# Patient Record
Sex: FEMALE | Race: WHITE | Employment: STUDENT | URBAN - METROPOLITAN AREA
[De-identification: names, ages, dates, MRNs, and addresses within clinical notes are randomized per-mention and may not be internally consistent; named-entity substitution may affect disease eponyms.]

---
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
## 2021-12-08 ENCOUNTER — OFFICE VISIT (OUTPATIENT)
Dept: FAMILY MEDICINE CLINIC | Facility: CLINIC | Age: 21
End: 2021-12-08
Payer: COMMERCIAL

## 2021-12-08 VITALS
OXYGEN SATURATION: 100 % | RESPIRATION RATE: 20 BRPM | TEMPERATURE: 98 F | WEIGHT: 131.19 LBS | SYSTOLIC BLOOD PRESSURE: 103 MMHG | BODY MASS INDEX: 21.6 KG/M2 | HEIGHT: 65.5 IN | DIASTOLIC BLOOD PRESSURE: 60 MMHG | HEART RATE: 62 BPM

## 2021-12-08 DIAGNOSIS — Z11.52 ENCOUNTER FOR SCREENING FOR COVID-19: ICD-10-CM

## 2021-12-08 DIAGNOSIS — J06.9 VIRAL URI WITH COUGH: Primary | ICD-10-CM

## 2021-12-08 PROCEDURE — 3078F DIAST BP <80 MM HG: CPT | Performed by: NURSE PRACTITIONER

## 2021-12-08 PROCEDURE — 3074F SYST BP LT 130 MM HG: CPT | Performed by: NURSE PRACTITIONER

## 2021-12-08 PROCEDURE — 99202 OFFICE O/P NEW SF 15 MIN: CPT | Performed by: NURSE PRACTITIONER

## 2021-12-08 PROCEDURE — 3008F BODY MASS INDEX DOCD: CPT | Performed by: NURSE PRACTITIONER

## 2021-12-08 RX ORDER — BENZONATATE 200 MG/1
200 CAPSULE ORAL 3 TIMES DAILY PRN
Qty: 30 CAPSULE | Refills: 0 | Status: SHIPPED | OUTPATIENT
Start: 2021-12-08 | End: 2021-12-18

## 2021-12-08 NOTE — PATIENT INSTRUCTIONS
Benzonatate perles every eight hours with large glass of water for cough as needed. Salt water gargles (1 tsp. Salt in 6 oz lukewarm water), use several times daily to help decrease swelling and pain in throat if needed.   Saline nasal spray to nostrils if

## 2021-12-08 NOTE — PROGRESS NOTES
HPI:   Yuliet Herrera is a 24year old female who presents with ill symptoms for about 1-2  weeks. Patient reports began as upper respiratory congestion and has progressed into chest tightness/cough.  Concerned since past history of pneumonia and bronchitis midline. NECK: supple,no adenopathy  LUNGS: clear to auscultation all lobes with no coughing appreciated until after deep inspiration/expiration  CARDIO: RRR without murmur      ASSESSMENT AND PLAN:    PLAN:Inge was seen today for nasal congestion. if needed. No work or school until fever free for 24 hours or until 10 days have passed and symptoms are improved if covid is positive. Follow up with primary care in the next week if symptoms not complete resolved or sooner if worsening symptoms.  Seek i

## 2024-05-09 ENCOUNTER — OFFICE VISIT (OUTPATIENT)
Dept: FAMILY MEDICINE CLINIC | Facility: CLINIC | Age: 24
End: 2024-05-09
Payer: COMMERCIAL

## 2024-05-09 ENCOUNTER — HOSPITAL ENCOUNTER (INPATIENT)
Facility: HOSPITAL | Age: 24
LOS: 2 days | Discharge: HOME | DRG: 392 | End: 2024-05-14
Attending: EMERGENCY MEDICINE | Admitting: HOSPITALIST

## 2024-05-09 ENCOUNTER — APPOINTMENT (OUTPATIENT)
Dept: CT IMAGING | Facility: HOSPITAL | Age: 24
DRG: 392 | End: 2024-05-09
Attending: HOSPITALIST

## 2024-05-09 VITALS
WEIGHT: 129 LBS | HEART RATE: 52 BPM | OXYGEN SATURATION: 97 % | TEMPERATURE: 97 F | DIASTOLIC BLOOD PRESSURE: 68 MMHG | RESPIRATION RATE: 16 BRPM | HEIGHT: 65.5 IN | SYSTOLIC BLOOD PRESSURE: 102 MMHG | BODY MASS INDEX: 21.23 KG/M2

## 2024-05-09 DIAGNOSIS — R11.15 CYCLICAL VOMITING: Primary | ICD-10-CM

## 2024-05-09 DIAGNOSIS — N17.9 AKI (ACUTE KIDNEY INJURY) (HCC): ICD-10-CM

## 2024-05-09 DIAGNOSIS — R11.2 NAUSEA AND VOMITING, UNSPECIFIED VOMITING TYPE: Primary | ICD-10-CM

## 2024-05-09 LAB
ALBUMIN SERPL-MCNC: 4 G/DL (ref 3.4–5)
ALBUMIN/GLOB SERPL: 1.1 {RATIO} (ref 1–2)
ALP LIVER SERPL-CCNC: 62 U/L
ALT SERPL-CCNC: 16 U/L
ANION GAP SERPL CALC-SCNC: 5 MMOL/L (ref 0–18)
AST SERPL-CCNC: 16 U/L (ref 15–37)
B-HCG UR QL: NEGATIVE
BASOPHILS # BLD AUTO: 0.02 X10(3) UL (ref 0–0.2)
BASOPHILS NFR BLD AUTO: 0.2 %
BILIRUB SERPL-MCNC: 0.7 MG/DL (ref 0.1–2)
BILIRUB UR QL STRIP.AUTO: NEGATIVE
BUN BLD-MCNC: 32 MG/DL (ref 9–23)
CALCIUM BLD-MCNC: 9.3 MG/DL (ref 8.5–10.1)
CHLORIDE SERPL-SCNC: 106 MMOL/L (ref 98–112)
CO2 SERPL-SCNC: 28 MMOL/L (ref 21–32)
COLOR UR AUTO: YELLOW
CREAT BLD-MCNC: 2.27 MG/DL
EGFRCR SERPLBLD CKD-EPI 2021: 30 ML/MIN/1.73M2 (ref 60–?)
EOSINOPHIL # BLD AUTO: 0 X10(3) UL (ref 0–0.7)
EOSINOPHIL NFR BLD AUTO: 0 %
ERYTHROCYTE [DISTWIDTH] IN BLOOD BY AUTOMATED COUNT: 11.8 %
GLOBULIN PLAS-MCNC: 3.6 G/DL (ref 2.8–4.4)
GLUCOSE BLD-MCNC: 114 MG/DL (ref 70–99)
GLUCOSE UR STRIP.AUTO-MCNC: NEGATIVE MG/DL
HCT VFR BLD AUTO: 39.7 %
HGB BLD-MCNC: 13.8 G/DL
IMM GRANULOCYTES # BLD AUTO: 0.05 X10(3) UL (ref 0–1)
IMM GRANULOCYTES NFR BLD: 0.4 %
KETONES UR STRIP.AUTO-MCNC: NEGATIVE MG/DL
LEUKOCYTE ESTERASE UR QL STRIP.AUTO: NEGATIVE
LIPASE SERPL-CCNC: 22 U/L (ref 13–75)
LYMPHOCYTES # BLD AUTO: 1.48 X10(3) UL (ref 1–4)
LYMPHOCYTES NFR BLD AUTO: 12.4 %
MCH RBC QN AUTO: 31.9 PG (ref 26–34)
MCHC RBC AUTO-ENTMCNC: 34.8 G/DL (ref 31–37)
MCV RBC AUTO: 91.9 FL
MONOCYTES # BLD AUTO: 0.81 X10(3) UL (ref 0.1–1)
MONOCYTES NFR BLD AUTO: 6.8 %
NEUTROPHILS # BLD AUTO: 9.62 X10 (3) UL (ref 1.5–7.7)
NEUTROPHILS # BLD AUTO: 9.62 X10(3) UL (ref 1.5–7.7)
NEUTROPHILS NFR BLD AUTO: 80.2 %
NITRITE UR QL STRIP.AUTO: NEGATIVE
OSMOLALITY SERPL CALC.SUM OF ELEC: 296 MOSM/KG (ref 275–295)
PH UR STRIP.AUTO: 6 [PH] (ref 5–8)
PLATELET # BLD AUTO: 266 10(3)UL (ref 150–450)
POTASSIUM SERPL-SCNC: 3.4 MMOL/L (ref 3.5–5.1)
PROT SERPL-MCNC: 7.6 G/DL (ref 6.4–8.2)
PROT UR STRIP.AUTO-MCNC: >=300 MG/DL
RBC # BLD AUTO: 4.32 X10(6)UL
SODIUM SERPL-SCNC: 139 MMOL/L (ref 136–145)
SP GR UR STRIP.AUTO: 1.02 (ref 1–1.03)
UROBILINOGEN UR STRIP.AUTO-MCNC: 0.2 MG/DL
WBC # BLD AUTO: 12 X10(3) UL (ref 4–11)

## 2024-05-09 PROCEDURE — 74176 CT ABD & PELVIS W/O CONTRAST: CPT | Performed by: HOSPITALIST

## 2024-05-09 PROCEDURE — 3008F BODY MASS INDEX DOCD: CPT | Performed by: NURSE PRACTITIONER

## 2024-05-09 PROCEDURE — 3074F SYST BP LT 130 MM HG: CPT | Performed by: NURSE PRACTITIONER

## 2024-05-09 PROCEDURE — 3078F DIAST BP <80 MM HG: CPT | Performed by: NURSE PRACTITIONER

## 2024-05-09 PROCEDURE — 99215 OFFICE O/P EST HI 40 MIN: CPT | Performed by: NURSE PRACTITIONER

## 2024-05-09 PROCEDURE — 99223 1ST HOSP IP/OBS HIGH 75: CPT | Performed by: HOSPITALIST

## 2024-05-09 RX ORDER — HYOSCYAMINE SULFATE 0.125 MG
0.12 TABLET,DISINTEGRATING ORAL EVERY 4 HOURS PRN
Status: DISCONTINUED | OUTPATIENT
Start: 2024-05-09 | End: 2024-05-14

## 2024-05-09 RX ORDER — ACETAMINOPHEN 500 MG
1000 TABLET ORAL EVERY 4 HOURS PRN
Status: DISCONTINUED | OUTPATIENT
Start: 2024-05-09 | End: 2024-05-14

## 2024-05-09 RX ORDER — ONDANSETRON 2 MG/ML
4 INJECTION INTRAMUSCULAR; INTRAVENOUS EVERY 6 HOURS PRN
Status: DISCONTINUED | OUTPATIENT
Start: 2024-05-09 | End: 2024-05-14

## 2024-05-09 RX ORDER — KETOROLAC TROMETHAMINE 15 MG/ML
15 INJECTION, SOLUTION INTRAMUSCULAR; INTRAVENOUS ONCE
Status: COMPLETED | OUTPATIENT
Start: 2024-05-09 | End: 2024-05-09

## 2024-05-09 RX ORDER — BISACODYL 10 MG
10 SUPPOSITORY, RECTAL RECTAL
Status: DISCONTINUED | OUTPATIENT
Start: 2024-05-09 | End: 2024-05-14

## 2024-05-09 RX ORDER — POLYETHYLENE GLYCOL 3350 17 G/17G
17 POWDER, FOR SOLUTION ORAL DAILY PRN
Status: DISCONTINUED | OUTPATIENT
Start: 2024-05-09 | End: 2024-05-14

## 2024-05-09 RX ORDER — MELATONIN
3 NIGHTLY PRN
Status: DISCONTINUED | OUTPATIENT
Start: 2024-05-09 | End: 2024-05-14

## 2024-05-09 RX ORDER — ENEMA 19; 7 G/133ML; G/133ML
1 ENEMA RECTAL ONCE AS NEEDED
Status: DISCONTINUED | OUTPATIENT
Start: 2024-05-09 | End: 2024-05-11

## 2024-05-09 RX ORDER — PROCHLORPERAZINE EDISYLATE 5 MG/ML
5 INJECTION INTRAMUSCULAR; INTRAVENOUS EVERY 8 HOURS PRN
Status: DISCONTINUED | OUTPATIENT
Start: 2024-05-09 | End: 2024-05-14

## 2024-05-09 RX ORDER — ONDANSETRON 4 MG/1
4 TABLET, ORALLY DISINTEGRATING ORAL EVERY 8 HOURS PRN
COMMUNITY

## 2024-05-09 RX ORDER — ONDANSETRON 2 MG/ML
4 INJECTION INTRAMUSCULAR; INTRAVENOUS ONCE
Status: COMPLETED | OUTPATIENT
Start: 2024-05-09 | End: 2024-05-09

## 2024-05-09 RX ORDER — MAGNESIUM HYDROXIDE/ALUMINUM HYDROXICE/SIMETHICONE 120; 1200; 1200 MG/30ML; MG/30ML; MG/30ML
30 SUSPENSION ORAL 4 TIMES DAILY PRN
Status: DISCONTINUED | OUTPATIENT
Start: 2024-05-09 | End: 2024-05-14

## 2024-05-09 RX ORDER — SODIUM CHLORIDE, SODIUM LACTATE, POTASSIUM CHLORIDE, CALCIUM CHLORIDE 600; 310; 30; 20 MG/100ML; MG/100ML; MG/100ML; MG/100ML
INJECTION, SOLUTION INTRAVENOUS CONTINUOUS
Status: DISCONTINUED | OUTPATIENT
Start: 2024-05-09 | End: 2024-05-12

## 2024-05-09 RX ORDER — SENNOSIDES 8.6 MG
17.2 TABLET ORAL NIGHTLY PRN
Status: DISCONTINUED | OUTPATIENT
Start: 2024-05-09 | End: 2024-05-14

## 2024-05-09 RX ORDER — TRAMADOL HYDROCHLORIDE 50 MG/1
50 TABLET ORAL EVERY 6 HOURS PRN
Status: DISCONTINUED | OUTPATIENT
Start: 2024-05-09 | End: 2024-05-14

## 2024-05-09 RX ORDER — PANTOPRAZOLE SODIUM 40 MG/1
40 TABLET, DELAYED RELEASE ORAL
Status: DISCONTINUED | OUTPATIENT
Start: 2024-05-10 | End: 2024-05-11

## 2024-05-09 RX ORDER — ECHINACEA PURPUREA EXTRACT 125 MG
1 TABLET ORAL
Status: DISCONTINUED | OUTPATIENT
Start: 2024-05-09 | End: 2024-05-14

## 2024-05-09 NOTE — ED QUICK NOTES
Orders for admission, patient is aware of plan and ready to go upstairs. Any questions, please call ED RN kenzie at extension [phone].     Patient Covid vaccination status: Unvaccinated     COVID Test Ordered in ED: None    COVID Suspicion at Admission: N/A    Running Infusions:      Mental Status/LOC at time of transport: A&Ox3    Other pertinent information:   CIWA score: N/A   NIH score:  N/A

## 2024-05-09 NOTE — ED QUICK NOTES
Per MD pt family can drive her to [facility] in [place] when room is read.    Dutasteride Pregnancy And Lactation Text: This medication is absolutely contraindicated in women, especially during pregnancy and breast feeding. Feminization of male fetuses is possible if taking while pregnant.

## 2024-05-09 NOTE — PROGRESS NOTES
Pt presented with vomiting, abdominal pain X 2 days.  Was seen in ED and given ODT Zofran, but reports she is unable to keep it down.    /68   Pulse 52   Temp 97.1 °F (36.2 °C) (Temporal)   Resp 16   Ht 5' 5.5\" (1.664 m)   Wt 129 lb (58.5 kg)   LMP 05/02/2024 (Approximate)   SpO2 97%   BMI 21.14 kg/m²     Accompanied by: self  After triage, a higher level of care was recommended to pt.  Discussed limitations of WIC and need for further evaluation due to needing further eval and treatment.  Referred to: PED  Patient verbalized understanding of rationale for further evaluation and was stable upon discharge.

## 2024-05-09 NOTE — ED PROVIDER NOTES
Patient Seen in: [facility] In [place]      History     Chief Complaint   Patient presents with    Nausea/Vomiting/Diarrhea     Stated Complaint: From [facility] - N/V x 2 days    Subjective:   HPI    23-year-old female comes to the hospital complaint of having difficulty with 2 days of nausea vomiting with some epigastric discomfort.  She states that she was in the emergency room on Sunday where she was having painful..  She is to take Benadryl pills to help control her pain with her periods but stopped after cyst was found on her right breast closer to a year ago.  She is not having any significant lower pain in the.  Pain has dissipated.  She does admit to using marijuana but that she typically does not have nausea and vomiting with it.  She denies any diarrhea.  No fevers or chills.  No dysuria.  She says she is vomiting about 5 times a day and having trouble holding anything down.  She is denying other complaints this time.    Objective:   History reviewed. No pertinent past medical history.           History reviewed. No pertinent surgical history.             Social History     Socioeconomic History    Marital status: Single   Tobacco Use    Smoking status: Never    Smokeless tobacco: Never   Vaping Use    Vaping status: Never Used   Substance and Sexual Activity    Alcohol use: Not Currently    Drug use: Yes     Frequency: 3.0 times per week     Types: Cannabis              Review of Systems    Positive for stated complaint: From [place] - N/V x 2 days  Other systems are as noted in HPI.  Constitutional and vital signs reviewed.      All other systems reviewed and negative except as noted above.    Physical Exam     ED Triage Vitals [05/09/24 1702]   /73   Pulse (!) 47   Resp 16   Temp 98.1 °F (36.7 °C)   Temp src Oral   SpO2 100 %   O2 Device        Current Vitals:   Vital Signs  BP: 126/73  Pulse: (!) 47  Resp: 16  Temp: 98.1 °F (36.7 °C)  Temp src: Oral    Oxygen Therapy  SpO2: 100  %            Physical Exam    HEENT; NCAT, EOMI, throat clear, neck supple, no LAD, no JVD  Heart S1S2 RRR  lungs: CTAB  abd: Soft  epigastric region is tender, ND,  NABS without rebound or guarding  Ext no C/C/E    ED Course     Labs Reviewed   COMP METABOLIC PANEL (14) - Abnormal; Notable for the following components:       Result Value    Glucose 114 (*)     Potassium 3.4 (*)     BUN 32 (*)     Creatinine 2.27 (*)     Calculated Osmolality 296 (*)     eGFR-Cr 30 (*)     All other components within normal limits   URINALYSIS, ROUTINE - Abnormal; Notable for the following components:    Clarity Urine Hazy (*)     Blood Urine Moderate (*)     Protein Urine >=300 (*)     All other components within normal limits   UA MICROSCOPIC ONLY, URINE - Abnormal; Notable for the following components:    Bacteria Urine 1+ (*)     Squamous Epi. Cells Moderate (*)     All other components within normal limits   CBC W/ DIFFERENTIAL - Abnormal; Notable for the following components:    WBC 12.0 (*)     Neutrophil Absolute Prelim 9.62 (*)     Neutrophil Absolute 9.62 (*)     All other components within normal limits   LIPASE - Normal   POCT PREGNANCY URINE - Normal   CBC WITH DIFFERENTIAL WITH PLATELET    Narrative:     The following orders were created for panel order CBC With Differential With Platelet.  Procedure                               Abnormality         Status                     ---------                               -----------         ------                     CBC W/ DIFFERENTIAL[[id number]]          Abnormal            Final result                 Please view results for these tests on the individual orders.          ED Course as of 05/09/24 1838  ------------------------------------------------------------  Time: 05/09 1837  Comment: While here the patient's electrolytes had a BUN/creatinine of 32 and 2.27 respectively.  Her pregnancy test was negative.  Her urine seem contaminated awake and was 12,000.  She was  given a combination of Toradol, Zofran, Protonix as well as IV fluid.  With this patient's acute kidney injury I feel the patient will need to be admitted for further hydration at this time.  The hospitalist been notified.         Medications   ketorolac (Toradol) 15 MG/ML injection 15 mg (15 mg Intravenous Given 5/9/24 1733)   ondansetron (Zofran) 4 MG/2ML injection 4 mg (4 mg Intravenous Given 5/9/24 1733)   sodium chloride 0.9 % IV bolus 1,000 mL (1,000 mL Intravenous New Bag 5/9/24 1733)   pantoprazole (Protonix) 40 mg in sodium chloride 0.9% PF 10 mL IV push (40 mg Intravenous Given 5/9/24 1733)              MDM      Differential diagnosis included gastroenteritis, cyclic vomiting, urine tract infection, dehydration in this case acute kidney injury but not limited such.  At this time in light of the patient's acute kidney injury and symptoms, I feel the patient needs to be admitted for further hydration at this time.  The hospital notified.  This note was prepared using   [employer] voice recognition dictation software.  As a result errors may occur.  When identified to these areas have been corrected.  While every attempt is made to correct errors during dictation discrepancies may still exist.  Please contact if there are any errors.            Admission disposition: 5/9/2024  6:38 PM                                        Medical Decision Making      Disposition and Plan     Clinical Impression:  1. Cyclical vomiting    2. JAYY (acute kidney injury) ([facility])         Disposition:  Admit  5/9/2024  6:38 pm    Follow-up:  No follow-up provider specified.        Medications Prescribed:  Current Discharge Medication List                            Hospital Problems       Present on Admission  Date Reviewed: 5/9/2024            ICD-10-CM Noted POA    * (Principal) Cyclical vomiting R11.15 5/9/2024 Unknown

## 2024-05-10 PROBLEM — R11.2 INTRACTABLE NAUSEA AND VOMITING: Status: ACTIVE | Noted: 2024-05-10

## 2024-05-10 PROBLEM — N83.201 CYST OF RIGHT OVARY: Status: ACTIVE | Noted: 2024-05-10

## 2024-05-10 LAB
ANION GAP SERPL CALC-SCNC: 7 MMOL/L (ref 0–18)
BASOPHILS # BLD AUTO: 0.02 X10(3) UL (ref 0–0.2)
BASOPHILS NFR BLD AUTO: 0.2 %
BUN BLD-MCNC: 33 MG/DL (ref 9–23)
CALCIUM BLD-MCNC: 8.8 MG/DL (ref 8.5–10.1)
CHLORIDE SERPL-SCNC: 114 MMOL/L (ref 98–112)
CO2 SERPL-SCNC: 22 MMOL/L (ref 21–32)
CREAT BLD-MCNC: 2.3 MG/DL
EGFRCR SERPLBLD CKD-EPI 2021: 30 ML/MIN/1.73M2 (ref 60–?)
EOSINOPHIL # BLD AUTO: 0.01 X10(3) UL (ref 0–0.7)
EOSINOPHIL NFR BLD AUTO: 0.1 %
ERYTHROCYTE [DISTWIDTH] IN BLOOD BY AUTOMATED COUNT: 11.7 %
GLUCOSE BLD-MCNC: 88 MG/DL (ref 70–99)
HCT VFR BLD AUTO: 33.4 %
HGB BLD-MCNC: 11.6 G/DL
IMM GRANULOCYTES # BLD AUTO: 0.05 X10(3) UL (ref 0–1)
IMM GRANULOCYTES NFR BLD: 0.5 %
LYMPHOCYTES # BLD AUTO: 2.16 X10(3) UL (ref 1–4)
LYMPHOCYTES NFR BLD AUTO: 21.2 %
MAGNESIUM SERPL-MCNC: 2.6 MG/DL (ref 1.6–2.6)
MCH RBC QN AUTO: 31.8 PG (ref 26–34)
MCHC RBC AUTO-ENTMCNC: 34.7 G/DL (ref 31–37)
MCV RBC AUTO: 91.5 FL
MONOCYTES # BLD AUTO: 0.96 X10(3) UL (ref 0.1–1)
MONOCYTES NFR BLD AUTO: 9.4 %
NEUTROPHILS # BLD AUTO: 7 X10 (3) UL (ref 1.5–7.7)
NEUTROPHILS # BLD AUTO: 7 X10(3) UL (ref 1.5–7.7)
NEUTROPHILS NFR BLD AUTO: 68.6 %
OSMOLALITY SERPL CALC.SUM OF ELEC: 303 MOSM/KG (ref 275–295)
PLATELET # BLD AUTO: 205 10(3)UL (ref 150–450)
POTASSIUM SERPL-SCNC: 3.8 MMOL/L (ref 3.5–5.1)
POTASSIUM SERPL-SCNC: 3.8 MMOL/L (ref 3.5–5.1)
RBC # BLD AUTO: 3.65 X10(6)UL
SODIUM SERPL-SCNC: 143 MMOL/L (ref 136–145)
WBC # BLD AUTO: 10.2 X10(3) UL (ref 4–11)

## 2024-05-10 PROCEDURE — 99232 SBSQ HOSP IP/OBS MODERATE 35: CPT | Performed by: STUDENT IN AN ORGANIZED HEALTH CARE EDUCATION/TRAINING PROGRAM

## 2024-05-10 NOTE — PLAN OF CARE
Assumed care at 0730. No acute distress noted.   Pt A&Ox4.   Room air.  No tele. Full liquid, advance as tolerated. No c/o pain, or n/v after eating.   Continent, BRP.   Ambulatory, SBA.   Meds per MAR. LR infusing at 125mL/hr per order.   Mom at bedside. Updated on POC. Probable discharge tomorrow pending morning labs.   Safety precautions in place.    Problem: PAIN - ADULT  Goal: Verbalizes/displays adequate comfort level or patient's stated pain goal  Description: INTERVENTIONS:  - Encourage pt to monitor pain and request assistance  - Assess pain using appropriate pain scale  - Administer analgesics based on type and severity of pain and evaluate response  - Implement non-pharmacological measures as appropriate and evaluate response  - Consider cultural and social influences on pain and pain management  - Manage/alleviate anxiety  - Utilize distraction and/or relaxation techniques  - Monitor for opioid side effects  - Notify MD/LIP if interventions unsuccessful or patient reports new pain  - Anticipate increased pain with activity and pre-medicate as appropriate  Outcome: Progressing     Problem: Patient/Family Goals  Goal: Patient/Family Long Term Goal  Description: Patient's Long Term Goal: Discharge back home w/o epigastric pain    Interventions:  - IVF, antiemetics, advancing diet as tolerated  - See additional Care Plan goals for specific interventions  Outcome: Progressing  Goal: Patient/Family Short Term Goal  Description: Patient's Short Term Goal: Tolerate diet     Interventions:   - Increase slowly, IVF  - See additional Care Plan goals for specific interventions  Outcome: Progressing

## 2024-05-10 NOTE — PROGRESS NOTES
[facility]   part of [facility]     Hospitalist Progress Note     Inge Barkley Patient Status:  Observation    2000 MRN [id number]   Location [facility] 3NE-A Attending Lucho, Otilio Chen, *   Hosp Day # 0 PCP Silva Ba MD     Chief Complaint: Abdominal pain    Subjective:      - Nausea/vomiting improving, able to tolerate FLD without issues, denies abd pain   - Last BM this AM, prior to that hadn't had a BM in 5 days   - Does endorse significant cannabis use, daily smoker for 4-5 years, never had issues with persistent n/v previously    - Denies associated f/c, cp, sob    Objective:    Review of Systems:   A comprehensive review of systems was completed; pertinent positive and negatives stated in subjective.    Vital signs:  Temp:  [97.1 °F (36.2 °C)-98.7 °F (37.1 °C)] 98.7 °F (37.1 °C)  Pulse:  [45-55] 55  Resp:  [16-20] 18  BP: (102-126)/(60-77) 105/70  SpO2:  [97 %-100 %] 98 %    Physical Exam:    General: No acute distress  Respiratory: no wheezes, no rhonchi  Cardiovascular: S1, S2, regular rate and rhythm  Abdomen: Soft, Non-tender, non-distended, positive bowel sounds  Neuro: No new focal deficits.   Extremities: no edema    Diagnostic Data:    Labs:  Recent Labs   Lab 24  1736   WBC 12.0*   HGB 13.8   MCV 91.9   .0       Recent Labs   Lab 24  1736   *   BUN 32*   CREATSERUM 2.27*   CA 9.3   ALB 4.0      K 3.4*      CO2 28.0   ALKPHO 62   AST 16   ALT 16   BILT 0.7   TP 7.6       Estimated Creatinine Clearance: 34.7 mL/min (A) (based on SCr of 2.27 mg/dL (H)).    No results for input(s): \"TROP\", \"TROPHS\", \"CK\" in the last 168 hours.    No results for input(s): \"PTP\", \"INR\" in the last 168 hours.               Microbiology    No results found for this visit on 05/09/24.      Imaging: Reviewed in Epic.    Medications:    pantoprazole  40 mg Intravenous QAM AC    Or    pantoprazole  40 mg Oral QAM AC       Assessment & Plan:      #  Intractable nausea/vomiting, resolving   # RLQ abdominal pain, resolved   - CT A/P with 1.8cm functional cyst in right ovary, with non-specific free fluid in pelvis   - Possibly 2/2 ruptured ovarian cyst vs. Viral gastroenteritis vs. Cannabis hyperemesis   - IVF, CLD ADAT, Antiemetics PRN   - PPI, maalox      # Functional ovarian cyst   - Gyne f/u as outpt     #JAYY   - Likely pre renal   - IVF, trend chem. CT A/P without hydro or obstruction; does have minimal perinephric stranding   - UA without infection     Otilio Yepez MD    Supplementary Documentation:     Quality:  DVT Mechanical Prophylaxis:     Early ambuation  DVT Pharmacologic Prophylaxis   Medication   None                Code Status: Full Code  Burnett: No urinary catheter in place  Burnett Duration (in days):   Central line:    SHAKEEL:     The 21st Century Cures Act makes medical notes like these available to patients in the interest of transparency. Please be advised this is a medical document. Medical documents are intended to carry relevant information, facts as evident, and the clinical opinion of the practitioner. The medical note is intended as peer to peer communication and may appear blunt or direct. It is written in medical language and may contain abbreviations or verbiage that are unfamiliar.

## 2024-05-10 NOTE — PLAN OF CARE
NURSING ADMISSION NOTE      Patient admitted via Ambulatory  Oriented to room.  Safety precautions initiated.  Pt reports nausea and given zofran.   LR started at 125.  K+ replaced per protocol.  Bed in low position.  Family & friends are bedside  Call light in reach.

## 2024-05-10 NOTE — H&P
[facility]     Inge Barkley Patient Status:  Observation    2000 MRN [id number]   Location [facility] 3NE-A Attending Mega Ken MD   Hosp Day # 0 PCP Silva Ba MD     Chief Complaint:   Chief Complaint   Patient presents with    Nausea/Vomiting/Diarrhea       Subjective:    History of Present Illness:     Inge Barkley is a 23 year old female with no significant past medical history.  She has had 2 days of nausea and vomiting with lower abdominal and epigastric discomfort.  She was the emergency room on  at an outside hospital where she had a pelvic ultrasound that showed mild to moderate pelvic fluid and a large right ovarian cyst.  She was discharged home but continued to have symptoms which she came to the emergency room here.  The patient does admit to using marijuana though has never had nausea vomiting within the past.  She does endorse having some intermittent chills but denies diarrhea.  She has had persistent nausea and vomiting.      History/Other:    Past Medical History:  History reviewed. No pertinent past medical history.  Past Surgical History:   History reviewed. No pertinent surgical history.   Family History:   No family history on file.  Social History:    reports that she has never smoked. She has never used smokeless tobacco. She reports that she does not currently use alcohol. She reports current drug use. Frequency: 3.00 times per week. Drug: Cannabis.     Allergies: No Known Allergies    Medications:    No current facility-administered medications on file prior to encounter.     Current Outpatient Medications on File Prior to Encounter   Medication Sig Dispense Refill    ondansetron 4 MG Oral Tablet Dispersible Take 1 tablet (4 mg total) by mouth every 8 (eight) hours as needed for Nausea.         Review of Systems:   A comprehensive review of systems was completed.    Pertinent positives and negatives noted in the  HPI.    Objective:   Physical Exam:    /60   Pulse (!) 49   Temp 98.4 °F (36.9 °C) (Oral)   Resp 20   Ht 5' 5\" (1.651 m)   Wt 130 lb (59 kg)   LMP 05/02/2024 (Approximate)   SpO2 99%   BMI 21.63 kg/m²   General: No acute distress, Alert  Respiratory: No rhonchi, no wheezes  Cardiovascular: S1, S2.   Abdomen: Soft, +BS, TTP RLQ and mild epigastric tenderness  Neuro: No new focal deficits  Extremities: No edema    Results:    Labs:      Labs Last 24 Hours:  Recent Labs   Lab 05/09/24  1736   WBC 12.0*   HGB 13.8   MCV 91.9   .0       Recent Labs   Lab 05/09/24  1736   *   BUN 32*   CREATSERUM 2.27*   CA 9.3   ALB 4.0      K 3.4*      CO2 28.0   ALKPHO 62   AST 16   ALT 16   BILT 0.7   TP 7.6       Estimated Creatinine Clearance: 34.7 mL/min (A) (based on SCr of 2.27 mg/dL (H)).    No results for input(s): \"TROP\", \"TROPHS\", \"CK\" in the last 168 hours.    No results for input(s): \"PTP\", \"INR\" in the last 168 hours.    No results for input(s): \"TROP\", \"CK\" in the last 168 hours.      Imaging: Imaging data reviewed in Epic.    Assessment & Plan:      #RLQ and epigastric Abd pain w/ N/V  Given chills and leukocytosis will check CT  U/A neg  Recent abd US with right ovarian cyst and mild to moderate pelvic fluid, likely contributing to symptoms  PPI and PRN maalox  Trial of relaxants  Discussed association of cannabinoids with cyclic vomiting though not clear that is contributing given timeline, has not used cannaboid in several days; pt to try hot shower to see if it helps    #JAYY  Likely pre renal  IVF  Further w/u if not improving with fluids    #Hypokalemia  Replace and monitor    #Leukocytosis  Await CT though no s/s of bacterial infection  Monitor off ABX        Plan of care discussed with ED physician    Mega Ken MD    Supplementary Documentation:     The 21st Century Cures Act makes medical notes like these available to patients in the interest of transparency. Please be  advised this is a medical document. Medical documents are intended to carry relevant information, facts as evident, and the clinical opinion of the practitioner. The medical note is intended as peer to peer communication and may appear blunt or direct. It is written in medical language and may contain abbreviations or verbiage that are unfamiliar.

## 2024-05-11 PROBLEM — R10.9 ABDOMINAL PAIN: Status: ACTIVE | Noted: 2024-05-11

## 2024-05-11 LAB
ADENOVIRUS F 40/41 PCR: NEGATIVE
ANION GAP SERPL CALC-SCNC: 8 MMOL/L (ref 0–18)
ASTROVIRUS PCR: NEGATIVE
BASOPHILS # BLD AUTO: 0.01 X10(3) UL (ref 0–0.2)
BASOPHILS NFR BLD AUTO: 0.1 %
BUN BLD-MCNC: 26 MG/DL (ref 9–23)
C CAYETANENSIS DNA SPEC QL NAA+PROBE: NEGATIVE
CALCIUM BLD-MCNC: 8.7 MG/DL (ref 8.5–10.1)
CAMPY SP DNA.DIARRHEA STL QL NAA+PROBE: NEGATIVE
CHLORIDE SERPL-SCNC: 111 MMOL/L (ref 98–112)
CO2 SERPL-SCNC: 22 MMOL/L (ref 21–32)
CREAT BLD-MCNC: 2.33 MG/DL
CRYPTOSP DNA SPEC QL NAA+PROBE: NEGATIVE
EAEC PAA PLAS AGGR+AATA ST NAA+NON-PRB: NEGATIVE
EC STX1+STX2 + H7 FLIC SPEC NAA+PROBE: NEGATIVE
EGFRCR SERPLBLD CKD-EPI 2021: 29 ML/MIN/1.73M2 (ref 60–?)
ENTAMOEBA HISTOLYTICA PCR: NEGATIVE
EOSINOPHIL # BLD AUTO: 0.02 X10(3) UL (ref 0–0.7)
EOSINOPHIL NFR BLD AUTO: 0.2 %
EPEC EAE GENE STL QL NAA+NON-PROBE: NEGATIVE
ERYTHROCYTE [DISTWIDTH] IN BLOOD BY AUTOMATED COUNT: 11.4 %
ETEC LTA+ST1A+ST1B TOX ST NAA+NON-PROBE: NEGATIVE
GIARDIA LAMBLIA PCR: NEGATIVE
GLUCOSE BLD-MCNC: 103 MG/DL (ref 70–99)
HCT VFR BLD AUTO: 34.7 %
HGB BLD-MCNC: 11.9 G/DL
IMM GRANULOCYTES # BLD AUTO: 0.03 X10(3) UL (ref 0–1)
IMM GRANULOCYTES NFR BLD: 0.3 %
LYMPHOCYTES # BLD AUTO: 1.54 X10(3) UL (ref 1–4)
LYMPHOCYTES NFR BLD AUTO: 17.4 %
MAGNESIUM SERPL-MCNC: 2.4 MG/DL (ref 1.6–2.6)
MCH RBC QN AUTO: 31.2 PG (ref 26–34)
MCHC RBC AUTO-ENTMCNC: 34.3 G/DL (ref 31–37)
MCV RBC AUTO: 91.1 FL
MONOCYTES # BLD AUTO: 0.86 X10(3) UL (ref 0.1–1)
MONOCYTES NFR BLD AUTO: 9.7 %
NEUTROPHILS # BLD AUTO: 6.39 X10 (3) UL (ref 1.5–7.7)
NEUTROPHILS # BLD AUTO: 6.39 X10(3) UL (ref 1.5–7.7)
NEUTROPHILS NFR BLD AUTO: 72.3 %
NOROVIRUS GI/GII PCR: NEGATIVE
OSMOLALITY SERPL CALC.SUM OF ELEC: 297 MOSM/KG (ref 275–295)
P SHIGELLOIDES DNA STL QL NAA+PROBE: NEGATIVE
PHOSPHATE SERPL-MCNC: 3.4 MG/DL (ref 2.5–4.9)
PLATELET # BLD AUTO: 197 10(3)UL (ref 150–450)
POTASSIUM SERPL-SCNC: 3.8 MMOL/L (ref 3.5–5.1)
RBC # BLD AUTO: 3.81 X10(6)UL
ROTAVIRUS A PCR: NEGATIVE
SALMONELLA DNA SPEC QL NAA+PROBE: NEGATIVE
SAPOVIRUS PCR: NEGATIVE
SHIGELLA SP+EIEC IPAH ST NAA+NON-PROBE: NEGATIVE
SODIUM SERPL-SCNC: 141 MMOL/L (ref 136–145)
V CHOLERAE DNA SPEC QL NAA+PROBE: NEGATIVE
VIBRIO DNA SPEC NAA+PROBE: NEGATIVE
WBC # BLD AUTO: 8.9 X10(3) UL (ref 4–11)
YERSINIA DNA SPEC NAA+PROBE: NEGATIVE

## 2024-05-11 PROCEDURE — 99232 SBSQ HOSP IP/OBS MODERATE 35: CPT | Performed by: STUDENT IN AN ORGANIZED HEALTH CARE EDUCATION/TRAINING PROGRAM

## 2024-05-11 RX ORDER — SENNOSIDES 8.6 MG
17.2 TABLET ORAL 2 TIMES DAILY
Status: DISCONTINUED | OUTPATIENT
Start: 2024-05-11 | End: 2024-05-12

## 2024-05-11 RX ORDER — POLYETHYLENE GLYCOL 3350 17 G/17G
17 POWDER, FOR SOLUTION ORAL DAILY
Status: DISCONTINUED | OUTPATIENT
Start: 2024-05-11 | End: 2024-05-12

## 2024-05-11 NOTE — CONSULTS
[facility]                       Gastroenterology Consultation-[facility] Gastroenterology    Inge Barkley Patient Status:  Observation    2000 MRN [id number]   Location [facility] 3NE-A Attending Otilio Yepez, *   Hosp Day # 0 PCP Silva Ba MD     Reason for consultation: Cyclical vomiting  HPI: Ms. Barkley is a 22 y/o with no significant PMHx who presents to the ED with reports of recurrent nausea and vomiting and epigastric pain that started on Thursday. She reports inability to tolerate liquids or foods, 1-2 episodes of non-bloody diarrhea (now resolved), and chills with no fevers. She reports feeling unwell last week when she started to have worsening menstrual cramping; evaluated in the ED on  where pelvic US noted moderate pelvic fluids and large right ovarian cyst; she was discharged home. She denies episodes of dysphagia, odynophagia, hematemesis, hematochezia, or melena stools or unintentional weight loss, poor appetite, or reflux symptoms. She takes NSAIDs occasionally, admits to daily marijuana use but reports none since last week and socially consumes alcohol. Initial labs note elevated BUN/Cr levels, no transaminase elevation or leukocytosis. CT a/p note small amount of nonspecific free fluid in the pelvis, probable 1.8 cm right ovarian cyst, nonspecific minimal bilateral perinephric stranding concerning for cystitis. She reports no prior hospitalizations, endoscopic evaluations gastrointestinal conditions or familial history of gastrointestinal cancers.   PMHx:   Past Medical History:    Intractable nausea and vomiting                PSHx: History reviewed. No pertinent surgical history.  Medications:    magnesium hydroxide (Milk of Magnesia) 400 MG/5ML oral suspension 30 mL  30 mL Oral Daily PRN    sennosides (Senokot) tab 17.2 mg  17.2 mg Oral BID    polyethylene glycol (PEG 3350) (Miralax) 17 g oral packet 17 g  17 g Oral Daily    pantoprazole (Protonix) 40 mg in  sodium chloride 0.9% PF 10 mL IV push  40 mg Intravenous Q12H    [COMPLETED] ketorolac (Toradol) 15 MG/ML injection 15 mg  15 mg Intravenous Once    [COMPLETED] ondansetron (Zofran) 4 MG/2ML injection 4 mg  4 mg Intravenous Once    [COMPLETED] sodium chloride 0.9 % IV bolus 1,000 mL  1,000 mL Intravenous Once    [COMPLETED] pantoprazole (Protonix) 40 mg in sodium chloride 0.9% PF 10 mL IV push  40 mg Intravenous Once    acetaminophen (Tylenol Extra Strength) tab 1,000 mg  1,000 mg Oral Q4H PRN    melatonin tab 3 mg  3 mg Oral Nightly PRN    glycerin-hypromellose- (Artificial Tears) 0.2-0.2-1 % ophthalmic solution 1 drop  1 drop Both Eyes QID PRN    sodium chloride (Saline Mist) 0.65 % nasal solution 1 spray  1 spray Each Nare Q3H PRN    lactated ringers infusion   Intravenous Continuous    ondansetron (Zofran) 4 MG/2ML injection 4 mg  4 mg Intravenous Q6H PRN    prochlorperazine (Compazine) 10 MG/2ML injection 5 mg  5 mg Intravenous Q8H PRN    polyethylene glycol (PEG 3350) (Miralax) 17 g oral packet 17 g  17 g Oral Daily PRN    sennosides (Senokot) tab 17.2 mg  17.2 mg Oral Nightly PRN    bisacodyl (Dulcolax) 10 MG rectal suppository 10 mg  10 mg Rectal Daily PRN    methocarbamol (Robaxin) partial tab 250 mg  250 mg Oral TID PRN    hyoscyamine sulfate (LEVSIN) disintegrating tab 0.125 mg  0.125 mg Sublingual Q4H PRN    traMADol (Ultram) tab 50 mg  50 mg Oral Q6H PRN    alum-mag hydroxide-simethicone (Maalox) 200-200-20 MG/5ML oral suspension 30 mL  30 mL Oral QID PRN    [COMPLETED] potassium chloride 40 mEq in 250mL sodium chloride 0.9% IVPB premix  40 mEq Intravenous Once     Allergies: No Known Allergies  Social HX:   Social History     Socioeconomic History    Marital status: Single   Tobacco Use    Smoking status: Never    Smokeless tobacco: Never   Vaping Use    Vaping status: Never Used   Substance and Sexual Activity    Alcohol use: Not Currently    Drug use: Yes     Frequency: 3.0 times per week      Types: Cannabis     Social Determinants of Health     Food Insecurity: No Food Insecurity (5/9/2024)    Food Insecurity     Food Insecurity: Never true   Transportation Needs: No Transportation Needs (5/9/2024)    Transportation Needs     Lack of Transportation: No   Housing Stability: Low Risk  (5/9/2024)    Housing Stability     Housing Instability: No      FamHx: The patient has no family history of colon cancer or other gastrointestinal malignancies;  No family history of ulcer disease, or inflammatory bowel disease  ROS:  In addition to the pertinent positives described above:            Infectious Disease:  No chronic infections or recent fevers prior to the acute illness            Cardiovascular: No history of CAD, prior MI, chest pain, or palpitations            Respiratory: No shortness of breath, asthma, copd, recurrent pneumonia            Hematologic: The patient reports no easy bruising, frequent gum bleeding or nose bleeding;  The patient has no history of known chronic anemia            Dermatologic: The patient reports no recent rashes or chronic skin disorders            Rheumatologic: The patient reports no history of chronic arthritis, myalgias, arthralgias            Genitourinary:  The patient reports no history of recurrent urinary tract infections, hematuria, dysuria, or nephrolithiasis           Psychiatric: The patient reports no history of depression, anxiety, suicidal ideation, or homicidal ideation           Oncologic: The patient reports no history of prior solid tumor or hematologic malignancy           ENT: The patient reports no hoarseness of voice, hearing loss, sinus congestion, tinnitus           Neurologic: The patient reports no history of seizure, stroke, or frequent headaches  PE: /76 (BP Location: Right arm)   Pulse (!) 43   Temp 98.3 °F (36.8 °C) (Oral)   Resp 18   Ht 5' 5\" (1.651 m)   Wt 130 lb (59 kg)   LMP 05/02/2024 (Approximate)   SpO2 98%   BMI 21.63  kg/m²   Gen: AAO x 3, able to speak in complete sentences  HENT: EOMI, PERRL, oropharynx is clear with moist mucosal membranes  Eyes: Sclerae are anicteric  Neck:  Supple without nuchal rigidity  CV: Regular rate and rhythm, with normal S1 and S2  Resp: Clear to auscultation bilaterally without wheezes; rubs, rhonchi, or rales  Abdomen: Soft, epigastric tenderness, non-distended with the presence of bowel sounds; No hepatosplenomegaly; no rebound or guarding; No ascites is clinically apparent; no tympany to percussion  Ext: No peripheral edema or cyanosis  Skin: Warm and dry  Psychiatric: Appropriate mood and congruent affect without obvious depression or anxiety  Labs:   Lab Results   Component Value Date    WBC 8.9 05/11/2024    HGB 11.9 05/11/2024    HCT 34.7 05/11/2024    .0 05/11/2024    CREATSERUM 2.33 05/11/2024    BUN 26 05/11/2024     05/11/2024    K 3.8 05/11/2024     05/11/2024    CO2 22.0 05/11/2024     05/11/2024    CA 8.7 05/11/2024    MG 2.4 05/11/2024    PHOS 3.4 05/11/2024     Recent Labs   Lab 05/09/24  1736 05/10/24  0723 05/11/24  0850   * 88 103*   BUN 32* 33* 26*   CREATSERUM 2.27* 2.30* 2.33*   CA 9.3 8.8 8.7    143 141   K 3.4* 3.8  3.8 3.8    114* 111   CO2 28.0 22.0 22.0     Recent Labs   Lab 05/11/24  0850   RBC 3.81   HGB 11.9*   HCT 34.7*   MCV 91.1   MCH 31.2   MCHC 34.3   RDW 11.4   NEPRELIM 6.39   WBC 8.9   .0       Recent Labs   Lab 05/09/24  1736   ALT 16   AST 16                   Imaging:   CT a/p:  Impression   CONCLUSION:       1. Findings concerning for cystitis.  Clinical correlation recommended.     2. 1.8 cm probable functional cyst in the right ovary could be further assessed with dedicated pelvic ultrasound as clinically directed.     3. Small amount of nonspecific free fluid in the pelvis may be physiologic.     Impression: Ms. Barkley is a 24 y/o with no significant PMHx who presents to the ED with reports of recurrent  nausea and vomiting and epigastric pain that started on Thursday.       Abdominal pain, N/V -CT a/p note small amount of nonspecific free fluid in the pelvis, probable 1.8 cm right ovarian cyst, nonspecific minimal bilateral perinephric stranding concerning for cystitis.    Due to symptoms, EGD discussed with patient to r/o esophagitis, gastritis, PUD; other possible diagnosis gastroparesis hyperemesis cannibalism and idiopathic cyclic vomiting syndrome.     The risks, benefits, alternatives of the procedure including the risks of anesthesia, bleeding, perforation, missed lesions, need for surgery were discussed with the patient. She expressed understanding of the risks and was agreeable to proceed. Plan for EGD in am with Dr. North.     Recommendations:     Clear liquid diet; NPO after MN  PPI IV BID  CBC/CMP in am  Pain management/antiemetics per primary service  EGD w/MAC on 5/12 with Dr. North    Thank you for the consultation, we will follow the patient with you.  Attending addendum Dr. North to follow later today and provide formal, final recommendations at that time   MARY Adams  2:25 PM  5/11/2024  [facility] Gastroenterology  [phone]  Attending physician addendum:   I personally saw and examined the patient, agree with the above findings, assessment and plan. Pt was having 5-8 episodes of emesis at home. She denies melena, hematochezia, fevers. She did have chills. She denies recent unusual ingestion. She had lower abdominal cramping but now has upper abdominal pain. She denies NSAIDs, weight loss, dysphagia. She notes some loose stools yesterday but did have Senakot for constipation.   She reports recent painful periods.   She does smoke Marijuana daily but reports that she did not smoke since Sunday.   CT showed possible cystitis but she denies dysuria, hematuria.   Recommend stool PCR. If this is negative, recommend EGD  The potentially life-threatening complications of infection,  sedation, bleeding, and perforation were reviewed along with the possible need for surgical management, transfusions, or repeat endoscopy should one of these complications arise. She understands and is agreeable.     Yulisa North DO  4:57 PM  5/11/2024  [facility] Gastroenterology  [phone]        Thank you for the consultation, we will follow the patient with you.  Attending addendum Dr. North to follow later today and provide formal, final recommendations at that time   MARY Adams  2:25 PM  5/11/2024  [facility] Gastroenterology  [phone]

## 2024-05-11 NOTE — PROGRESS NOTES
[facility]   part of [facility]     Hospitalist Progress Note     Inge JOHNSON Lubnaryanne Patient Status:  Observation    2000 MRN [id number]   Location [facility] 3NE-A Attending Otilio Yepez, *   Hosp Day # 0 PCP Silva Ba MD     Chief Complaint: Abdominal pain    Subjective:      - Pt still nauseous, unable to tolerate CLD, still having abd discomfort, denies associated f/c, cp, sob    - No BM noted    Objective:    Review of Systems:   A comprehensive review of systems was completed; pertinent positive and negatives stated in subjective.    Vital signs:  Temp:  [98 °F (36.7 °C)-98.3 °F (36.8 °C)] 98 °F (36.7 °C)  Pulse:  [41-47] 41  Resp:  [18] 18  BP: (110-118)/(69-81) 112/69  SpO2:  [98 %] 98 %    Physical Exam:    General: No acute distress  Respiratory: no wheezes, no rhonchi  Cardiovascular: S1, S2, regular rate and rhythm  Abdomen: Soft, ttp worst in epigastric region, non-distended, positive bowel sounds  Neuro: No new focal deficits.   Extremities: no edema    Diagnostic Data:    Labs:  Recent Labs   Lab 24  1736 05/10/24  0723   WBC 12.0* 10.2   HGB 13.8 11.6*   MCV 91.9 91.5   .0 205.0       Recent Labs   Lab 24  1736 05/10/24  0723   * 88   BUN 32* 33*   CREATSERUM 2.27* 2.30*   CA 9.3 8.8   ALB 4.0  --     143   K 3.4* 3.8  3.8    114*   CO2 28.0 22.0   ALKPHO 62  --    AST 16  --    ALT 16  --    BILT 0.7  --    TP 7.6  --        Estimated Creatinine Clearance: 34.2 mL/min (A) (based on SCr of 2.3 mg/dL (H)).    No results for input(s): \"TROP\", \"TROPHS\", \"CK\" in the last 168 hours.    No results for input(s): \"PTP\", \"INR\" in the last 168 hours.               Microbiology    No results found for this visit on 24.      Imaging: Reviewed in Epic.    Medications:    pantoprazole  40 mg Intravenous QAM AC    Or    pantoprazole  40 mg Oral QAM AC       Assessment & Plan:      # Intractable nausea/vomiting, resolving   # RLQ abdominal  pain, resolved   - CT A/P with 1.8cm functional cyst in right ovary, with non-specific free fluid in pelvis   - Possibly 2/2 ruptured ovarian cyst vs. Viral gastroenteritis vs. Cannabis hyperemesis   - IVF, CLD continued, Antiemetics PRN   - GI consulted given persistent N/v; possibly needs EGD?   - Adding bowel reg to r/o constipation as etiology    - PPI, maalox      # Functional ovarian cyst   - Gyne f/u as outpt     #JAYY   - Likely pre renal   - IVF, trend chem. CT A/P without hydro or obstruction; does have minimal perinephric stranding   - UA without infection      Otilio Yepez MD    Supplementary Documentation:     Quality:  DVT Mechanical Prophylaxis:     Early ambuation  DVT Pharmacologic Prophylaxis   Medication   None                Code Status: Full Code  Burnett: No urinary catheter in place  Burnett Duration (in days):   Central line:    SHAKEEL:     The 21st Century Cures Act makes medical notes like these available to patients in the interest of transparency. Please be advised this is a medical document. Medical documents are intended to carry relevant information, facts as evident, and the clinical opinion of the practitioner. The medical note is intended as peer to peer communication and may appear blunt or direct. It is written in medical language and may contain abbreviations or verbiage that are unfamiliar.

## 2024-05-11 NOTE — PLAN OF CARE
Assumed care at 0730. No acute distress noted.   Pt A&Ox4.   Room air.   No tele.   Continent, BRP.   Ambulatory, independent after set up.   Clear liquid diet, NPO at MN.   Meds per MAR. No c/o pain. N/v at start of shift.   LR infusing at 125mL/hr per order.   Updated on POC. Visitor at bedside.  GI consulted.   Stool sample to be collected when able, softeners given per MAR.   Safety precautions in place.   Needs met.     Problem: PAIN - ADULT  Goal: Verbalizes/displays adequate comfort level or patient's stated pain goal  Description: INTERVENTIONS:  - Encourage pt to monitor pain and request assistance  - Assess pain using appropriate pain scale  - Administer analgesics based on type and severity of pain and evaluate response  - Implement non-pharmacological measures as appropriate and evaluate response  - Consider cultural and social influences on pain and pain management  - Manage/alleviate anxiety  - Utilize distraction and/or relaxation techniques  - Monitor for opioid side effects  - Notify MD/LIP if interventions unsuccessful or patient reports new pain  - Anticipate increased pain with activity and pre-medicate as appropriate  Outcome: Progressing      Refill Approved    Rx renewed per Medication Renewal Policy. Medication was last renewed on 2/28/20.    Lianne Perales, [facility] Triage/Med Refill 5/18/2020     Requested Prescriptions   Pending Prescriptions Disp Refills     oxybutynin (DITROPAN XL) 10 MG ER tablet [Pharmacy Med Name: OXYBUTYNIN ER 10MG TABLETS] 90 tablet 0     Sig: TAKE 1 TABLET(10 MG) BY MOUTH DAILY       Urinary Incontinence Medications Refill Protocol Passed - 5/15/2020  6:57 PM        Passed - PCP or prescribing provider visit in past 12 months       Last office visit with prescriber/PCP: 2/27/2020 Cordell Ceja DO OR same dept: 2/27/2020 Cordell Ceja DO OR same specialty: 2/27/2020 Cordell Ceja DO  Last physical: 11/22/2019 Last MTM visit: Visit date not found   Next visit within 3 mo: Visit date not found  Next physical within 3 mo: Visit date not found  Prescriber OR PCP: Cordell eCja DO  Last diagnosis associated with med order: 1. Urge incontinence of urine  - oxybutynin (DITROPAN XL) 10 MG ER tablet [Pharmacy Med Name: OXYBUTYNIN ER 10MG TABLETS]; TAKE 1 TABLET(10 MG) BY MOUTH DAILY  Dispense: 90 tablet; Refill: 0    If protocol passes may refill for 12 months if within 3 months of last provider visit (or a total of 15 months).

## 2024-05-11 NOTE — PROGRESS NOTES
Pt A&Ox4 Room Air  Family at the bedside  Meds given per Mar  Voids freely  Monitoring Labs   1x Zofran given.   PPI  IVF infusing LR@125ml/Hr.  Safety precaution maintained  Plan of care on-going

## 2024-05-12 ENCOUNTER — ANESTHESIA EVENT (OUTPATIENT)
Dept: ENDOSCOPY | Facility: HOSPITAL | Age: 24
DRG: 392 | End: 2024-05-12
Payer: COMMERCIAL

## 2024-05-12 ENCOUNTER — ANESTHESIA (OUTPATIENT)
Dept: ENDOSCOPY | Facility: HOSPITAL | Age: 24
DRG: 392 | End: 2024-05-12
Payer: COMMERCIAL

## 2024-05-12 PROBLEM — G43.909 MIGRAINE: Status: ACTIVE | Noted: 2024-05-12

## 2024-05-12 PROBLEM — R80.9 PROTEINURIA: Status: ACTIVE | Noted: 2024-05-12

## 2024-05-12 LAB
ANION GAP SERPL CALC-SCNC: 9 MMOL/L (ref 0–18)
BASOPHILS # BLD AUTO: 0.02 X10(3) UL (ref 0–0.2)
BASOPHILS NFR BLD AUTO: 0.2 %
BILIRUB UR QL STRIP.AUTO: NEGATIVE
BUN BLD-MCNC: 22 MG/DL (ref 9–23)
C3 SERPL-MCNC: 123 MG/DL (ref 90–180)
C4 SERPL-MCNC: 30.3 MG/DL (ref 10–40)
CALCIUM BLD-MCNC: 8.8 MG/DL (ref 8.5–10.1)
CHLORIDE SERPL-SCNC: 108 MMOL/L (ref 98–112)
CLARITY UR REFRACT.AUTO: CLEAR
CO2 SERPL-SCNC: 24 MMOL/L (ref 21–32)
COLOR UR AUTO: COLORLESS
CREAT BLD-MCNC: 2.44 MG/DL
CREAT UR-SCNC: 31.2 MG/DL
EGFRCR SERPLBLD CKD-EPI 2021: 28 ML/MIN/1.73M2 (ref 60–?)
EOSINOPHIL # BLD AUTO: 0.05 X10(3) UL (ref 0–0.7)
EOSINOPHIL NFR BLD AUTO: 0.6 %
ERYTHROCYTE [DISTWIDTH] IN BLOOD BY AUTOMATED COUNT: 11.2 %
GLUCOSE BLD-MCNC: 90 MG/DL (ref 70–99)
GLUCOSE UR STRIP.AUTO-MCNC: NORMAL MG/DL
HCT VFR BLD AUTO: 33.5 %
HGB BLD-MCNC: 12 G/DL
IMM GRANULOCYTES # BLD AUTO: 0.03 X10(3) UL (ref 0–1)
IMM GRANULOCYTES NFR BLD: 0.3 %
KETONES UR STRIP.AUTO-MCNC: NEGATIVE MG/DL
LEUKOCYTE ESTERASE UR QL STRIP.AUTO: NEGATIVE
LYMPHOCYTES # BLD AUTO: 1.63 X10(3) UL (ref 1–4)
LYMPHOCYTES NFR BLD AUTO: 18.8 %
MAGNESIUM SERPL-MCNC: 2.3 MG/DL (ref 1.6–2.6)
MCH RBC QN AUTO: 32 PG (ref 26–34)
MCHC RBC AUTO-ENTMCNC: 35.8 G/DL (ref 31–37)
MCV RBC AUTO: 89.3 FL
MONOCYTES # BLD AUTO: 0.85 X10(3) UL (ref 0.1–1)
MONOCYTES NFR BLD AUTO: 9.8 %
NEUTROPHILS # BLD AUTO: 6.11 X10 (3) UL (ref 1.5–7.7)
NEUTROPHILS # BLD AUTO: 6.11 X10(3) UL (ref 1.5–7.7)
NEUTROPHILS NFR BLD AUTO: 70.3 %
NITRITE UR QL STRIP.AUTO: NEGATIVE
OSMOLALITY SERPL CALC.SUM OF ELEC: 295 MOSM/KG (ref 275–295)
PH UR STRIP.AUTO: 7 [PH] (ref 5–8)
PHOSPHATE SERPL-MCNC: 4.6 MG/DL (ref 2.5–4.9)
PLATELET # BLD AUTO: 197 10(3)UL (ref 150–450)
POTASSIUM SERPL-SCNC: 3.6 MMOL/L (ref 3.5–5.1)
PROT UR STRIP.AUTO-MCNC: 20 MG/DL
PROT UR-MCNC: 32.4 MG/DL
PROT/CREAT UR-RTO: 1.04
RBC # BLD AUTO: 3.75 X10(6)UL
SODIUM SERPL-SCNC: 103 MMOL/L
SODIUM SERPL-SCNC: 141 MMOL/L (ref 136–145)
SP GR UR STRIP.AUTO: 1.01 (ref 1–1.03)
UROBILINOGEN UR STRIP.AUTO-MCNC: NORMAL MG/DL
WBC # BLD AUTO: 8.7 X10(3) UL (ref 4–11)

## 2024-05-12 PROCEDURE — 0DB98ZX EXCISION OF DUODENUM, VIA NATURAL OR ARTIFICIAL OPENING ENDOSCOPIC, DIAGNOSTIC: ICD-10-PCS | Performed by: INTERNAL MEDICINE

## 2024-05-12 PROCEDURE — 0DB68ZX EXCISION OF STOMACH, VIA NATURAL OR ARTIFICIAL OPENING ENDOSCOPIC, DIAGNOSTIC: ICD-10-PCS | Performed by: INTERNAL MEDICINE

## 2024-05-12 PROCEDURE — 0DB78ZX EXCISION OF STOMACH, PYLORUS, VIA NATURAL OR ARTIFICIAL OPENING ENDOSCOPIC, DIAGNOSTIC: ICD-10-PCS | Performed by: INTERNAL MEDICINE

## 2024-05-12 PROCEDURE — 99232 SBSQ HOSP IP/OBS MODERATE 35: CPT | Performed by: STUDENT IN AN ORGANIZED HEALTH CARE EDUCATION/TRAINING PROGRAM

## 2024-05-12 PROCEDURE — 99223 1ST HOSP IP/OBS HIGH 75: CPT | Performed by: INTERNAL MEDICINE

## 2024-05-12 RX ORDER — POLYETHYLENE GLYCOL 3350 17 G/17G
17 POWDER, FOR SOLUTION ORAL DAILY PRN
Status: DISCONTINUED | OUTPATIENT
Start: 2024-05-12 | End: 2024-05-12

## 2024-05-12 RX ORDER — ONDANSETRON 2 MG/ML
4 INJECTION INTRAMUSCULAR; INTRAVENOUS ONCE AS NEEDED
Status: ACTIVE | OUTPATIENT
Start: 2024-05-12 | End: 2024-05-12

## 2024-05-12 RX ORDER — SENNOSIDES 8.6 MG
17.2 TABLET ORAL 2 TIMES DAILY PRN
Status: DISCONTINUED | OUTPATIENT
Start: 2024-05-12 | End: 2024-05-12

## 2024-05-12 RX ORDER — NALOXONE HYDROCHLORIDE 0.4 MG/ML
0.08 INJECTION, SOLUTION INTRAMUSCULAR; INTRAVENOUS; SUBCUTANEOUS ONCE AS NEEDED
Status: ACTIVE | OUTPATIENT
Start: 2024-05-12 | End: 2024-05-12

## 2024-05-12 RX ORDER — SODIUM CHLORIDE, SODIUM LACTATE, POTASSIUM CHLORIDE, CALCIUM CHLORIDE 600; 310; 30; 20 MG/100ML; MG/100ML; MG/100ML; MG/100ML
INJECTION, SOLUTION INTRAVENOUS CONTINUOUS
Status: DISCONTINUED | OUTPATIENT
Start: 2024-05-12 | End: 2024-05-14

## 2024-05-12 RX ORDER — SUMATRIPTAN 20 MG/1
20 SPRAY NASAL EVERY 2 HOUR PRN
Status: DISCONTINUED | OUTPATIENT
Start: 2024-05-12 | End: 2024-05-14

## 2024-05-12 RX ORDER — PANTOPRAZOLE SODIUM 40 MG/1
TABLET, DELAYED RELEASE ORAL
Qty: 30 TABLET | Refills: 0 | Status: SHIPPED | OUTPATIENT
Start: 2024-05-12

## 2024-05-12 RX ADMIN — SODIUM CHLORIDE, SODIUM LACTATE, POTASSIUM CHLORIDE, CALCIUM CHLORIDE: 600; 310; 30; 20 INJECTION, SOLUTION INTRAVENOUS at 08:21:00

## 2024-05-12 NOTE — PROGRESS NOTES
[facility]   part of [facility]     Hospitalist Progress Note     Inge Barkley Patient Status:  Observation    2000 MRN [id number]   Location [facility] 3NE-A Attending Lucho, Otilio Chen, *   Hosp Day # 0 PCP Silva Ba MD     Chief Complaint: Abdominal pain    Subjective:      - s/p EGD this AM, has findings of antral erythema.    - Pt does note she has frequent migraine HA, now approx 2x weekly, occurring since she was in middle school, never been on medication for this previously    - Takes Excedrin frequently for these HA    - Nausea improved, had multiple BM yesterday    - Denies other f/c, cp, sob    Objective:    Review of Systems:   A comprehensive review of systems was completed; pertinent positive and negatives stated in subjective.    Vital signs:  Temp:  [98.3 °F (36.8 °C)-98.5 °F (36.9 °C)] 98.5 °F (36.9 °C)  Pulse:  [42-46] 42  Resp:  [18] 18  BP: (110-122)/(71-82) 122/71  SpO2:  [97 %-98 %] 97 %    Physical Exam:    General: No acute distress  Respiratory: no wheezes, no rhonchi  Cardiovascular: S1, S2, regular rate and rhythm  Abdomen: Soft, mild ttp in epigastric region, non-distended, positive bowel sounds  Neuro: No new focal deficits.   Extremities: no edema    Diagnostic Data:    Labs:  Recent Labs   Lab 24  1736 05/10/24  0723 24  0850   WBC 12.0* 10.2 8.9   HGB 13.8 11.6* 11.9*   MCV 91.9 91.5 91.1   .0 205.0 197.0       Recent Labs   Lab 24  1736 05/10/24  0723 24  0850   * 88 103*   BUN 32* 33* 26*   CREATSERUM 2.27* 2.30* 2.33*   CA 9.3 8.8 8.7   ALB 4.0  --   --     143 141   K 3.4* 3.8  3.8 3.8    114* 111   CO2 28.0 22.0 22.0   ALKPHO 62  --   --    AST 16  --   --    ALT 16  --   --    BILT 0.7  --   --    TP 7.6  --   --        Estimated Creatinine Clearance: 33.8 mL/min (A) (based on SCr of 2.33 mg/dL (H)).    No results for input(s): \"TROP\", \"TROPHS\", \"CK\" in the last 168 hours.    No results for  input(s): \"PTP\", \"INR\" in the last 168 hours.               Microbiology    No results found for this visit on 05/09/24.      Imaging: Reviewed in Epic.    Medications:    sennosides  17.2 mg Oral BID    polyethylene glycol (PEG 3350)  17 g Oral Daily    pantoprazole  40 mg Intravenous Q12H       Assessment & Plan:      # Intractable nausea/vomiting, resolving   # RLQ abdominal pain, resolved   - CT A/P with 1.8cm functional cyst in right ovary, with non-specific free fluid in pelvis   - GI consulted, s/p EGD with findings of antral erythma, continue on PPI   - US abd ordered, pending   - now nausea improved, had multiple BM, backing off bowel reg   - Advised less excedrin use as outpt    - PPI, maalox      # Migraine HA   - d/w patient and family about f/u with neurology as outpt to consider longer term treatment for debilitating migraine HA    - imitrex PRN for abortive measures     # Functional ovarian cyst   - Gyne f/u as outpt     #JAYY   - Likely pre renal vs. 2/2 significant NSAID use   - IVF, trend chem. CT A/P without hydro or obstruction; does have minimal perinephric stranding   - UA without infection     - Repeat BMP pending this AM, if still persistently elevated will get nephrology on consult    Otilio Yepez MD    Supplementary Documentation:     Quality:  DVT Mechanical Prophylaxis:     Early ambuation  DVT Pharmacologic Prophylaxis   Medication   None                Code Status: Full Code  Burnett: No urinary catheter in place  Burnett Duration (in days):   Central line:    SHAKEEL:     The 21st Century Cures Act makes medical notes like these available to patients in the interest of transparency. Please be advised this is a medical document. Medical documents are intended to carry relevant information, facts as evident, and the clinical opinion of the practitioner. The medical note is intended as peer to peer communication and may appear blunt or direct. It is written in medical language and may  contain abbreviations or verbiage that are unfamiliar.

## 2024-05-12 NOTE — CONSULTS
[facility]  Report of Consultation    Inge Barkley Patient Status:  Observation    2000 MRN [id number]   Location [facility] 3NE-A Attending Otilio Yepez, *   Hosp Day # 0 PCP Silva Ba MD     Reason for Consultation:  JAYY    History of Present Illness:  Inge Barkley is a a(n) 23 year old female with no sig medical problems presented to the hosptial w/ complain of intractable n/v/d/abd pain for past 1 week.  Patient denies sig nsaid use- take excedrin prn- states she may have taken a total of 4-5 doses in past month  Denies dysuria  No hematuria  No body aches  No joint swelling  No rash  No edema    Father w/ some chronic joint swelling ; mother's sister w/ hx of Raynaud's    Cr on admit 2.27; has stayed about the same over past 4 days  UA w/ qualitative proteinuria      History:  Past Medical History:    Intractable nausea and vomiting     History reviewed. No pertinent surgical history.  No family history on file.   reports that she has never smoked. She has never used smokeless tobacco. She reports that she does not currently use alcohol. She reports current drug use. Frequency: 3.00 times per week. Drug: Cannabis.    Allergies:  No Known Allergies    Current Medications:    Current Facility-Administered Medications:     lactated ringers infusion, , Intravenous, Continuous    naloxone (Narcan) 0.4 MG/ML injection 0.08 mg, 0.08 mg, Intravenous, Once PRN    ondansetron (Zofran) 4 MG/2ML injection 4 mg, 4 mg, Intravenous, Once PRN    SUMAtriptan (Imitrex) 20 MG/ACT nasal solution 1 spray, 20 mg, Nasal, Q2H PRN    magnesium hydroxide (Milk of Magnesia) 400 MG/5ML oral suspension 30 mL, 30 mL, Oral, Daily PRN    pantoprazole (Protonix) 40 mg in sodium chloride 0.9% PF 10 mL IV push, 40 mg, Intravenous, Q12H    acetaminophen (Tylenol Extra Strength) tab 1,000 mg, 1,000 mg, Oral, Q4H PRN    melatonin tab 3 mg, 3 mg, Oral, Nightly PRN    glycerin-hypromellose- (Artificial Tears)  0.2-0.2-1 % ophthalmic solution 1 drop, 1 drop, Both Eyes, QID PRN    sodium chloride (Saline Mist) 0.65 % nasal solution 1 spray, 1 spray, Each Nare, Q3H PRN    ondansetron (Zofran) 4 MG/2ML injection 4 mg, 4 mg, Intravenous, Q6H PRN    prochlorperazine (Compazine) 10 MG/2ML injection 5 mg, 5 mg, Intravenous, Q8H PRN    polyethylene glycol (PEG 3350) (Miralax) 17 g oral packet 17 g, 17 g, Oral, Daily PRN    sennosides (Senokot) tab 17.2 mg, 17.2 mg, Oral, Nightly PRN    bisacodyl (Dulcolax) 10 MG rectal suppository 10 mg, 10 mg, Rectal, Daily PRN    methocarbamol (Robaxin) partial tab 250 mg, 250 mg, Oral, TID PRN    hyoscyamine sulfate (LEVSIN) disintegrating tab 0.125 mg, 0.125 mg, Sublingual, Q4H PRN    traMADol (Ultram) tab 50 mg, 50 mg, Oral, Q6H PRN    alum-mag hydroxide-simethicone (Maalox) 200-200-20 MG/5ML oral suspension 30 mL, 30 mL, Oral, QID PRN  Home Medications:  Prior to Admission Medications   Medication Sig    pantoprazole 40 MG Oral Tab EC Take one tablet (40 mg total) by mouth once daily, 30 minutes prior to breakfast.       Review of Systems:  See HPI; A total of 12 systems reviewed and otherwise unremarkable.    Physical Exam:  Vital signs: Blood pressure 137/87, pulse (!) 42, temperature 98.3 °F (36.8 °C), temperature source Oral, resp. rate 19, height 5' 5\" (1.651 m), weight 130 lb (59 kg), last menstrual period 05/02/2024, SpO2 98%.  General: No acute distress. Alert and oriented x 3.  HEENT: Moist mucous membranes. EOM-I. PERRL  Neck: No lymphadenopathy.  No JVD. No carotid bruits.  Respiratory: Clear to auscultation bilaterally.  No wheezes. No rhonchi.  Cardiovascular: S1, S2.  Regular rate and rhythm.  No murmurs. Equal pulses   Abdomen: Soft, nontender, nondistended.  Positive bowel sounds. No rebound tenderness   Neurologic: No focal neurological deficits.   Musculoskeletal: Full range of motion of all extremities.  No swelling noted.   Integument: No lesions. No  erythema.  Psychiatric: Appropriate mood and affect.    Laboratory:  Lab Results   Component Value Date    WBC 8.7 05/12/2024    HGB 12.0 05/12/2024    HCT 33.5 05/12/2024    .0 05/12/2024    CREATSERUM 2.44 05/12/2024    BUN 22 05/12/2024     05/12/2024    K 3.6 05/12/2024     05/12/2024    CO2 24.0 05/12/2024    GLU 90 05/12/2024    CA 8.8 05/12/2024    MG 2.3 05/12/2024    PHOS 4.6 05/12/2024          BUN (mg/dL)   Date Value   05/12/2024 22   05/11/2024 26 (H)   05/10/2024 33 (H)     Creatinine (mg/dL)   Date Value   05/12/2024 2.44 (H)   05/11/2024 2.33 (H)   05/10/2024 2.30 (H)     Preg test negative    Imaging:  CT A/P reviewed    Impression:  JAYY- Cr normal as of 2021 labs (Care Everywhere). Unclear etiology. Patient had presented w/ persisent n/v/d x 1 week.  No sig nsaid use. Initially thought and treated as a pre-renal cause, but Cr has not improved w/ hydration. Concern for intrinsic process such as ATN vs. Possible GN (especially given abnormal urine sediment)  Imaging does not show any evidence of obsructive uropathy- ? Cystitis  - check urine cultures; repeat UA; check urine chem  - continue fluids  - quantify protein; check C3/C4,RHETT- given patient's age/demographics there is concern for possible autoimmune cause  - further eval pending review if above  N/V/D- improving; symptomatic tx- GI following; s/p EGD; PPI  Hx of migraines      Thank you for allowing me to participate in this patient's care.  Please feel free to call me with any questions or concerns.    Saad Horton MD  5/12/2024

## 2024-05-12 NOTE — ANESTHESIA POSTPROCEDURE EVALUATION
[facility]    Inge Barkley Patient Status:  Observation   Age/Gender 23 year old female MRN [id number]   Location [facility] Attending Otilio Yepez, *   Hosp Day # 0 PCP Silva Ba MD       Anesthesia Post-op Note    ESOPHAGOGASTRODUODENOSCOPY (EGD) WITH BIOPSIES    Procedure Summary       Date: 05/12/24 Room / Location:  ENDOSCOPY 03 /  ENDOSCOPY    Anesthesia Start: 0803 Anesthesia Stop:     Procedure: ESOPHAGOGASTRODUODENOSCOPY (EGD) WITH BIOPSIES Diagnosis:       Cyclical vomiting      (GASTRITIS)    Surgeons: Yulisa North DO Anesthesiologist: Richa Taylor DO    Anesthesia Type: MAC ASA Status: 1            Anesthesia Type: MAC    Vitals Value Taken Time   /58 05/12/24 0824   Temp 98 °F (36.7 °C) 05/12/24 0824   Pulse 44 05/12/24 0824   Resp 16 05/12/24 0824   SpO2 98 % 05/12/24 0824       Patient Location: PACU    Anesthesia Type: MAC    Airway Patency: patent    Postop Pain Control: adequate    Mental Status: mildly sedated but able to meaningfully participate in the post-anesthesia evaluation    Nausea/Vomiting: none    Cardiopulmonary/Hydration status: stable euvolemic    Complications: no apparent anesthesia related complications    Postop vital signs: stable    Dental Exam: Unchanged from Preop    Patient to be discharged from PACU when criteria met.

## 2024-05-12 NOTE — PLAN OF CARE
Assumed care at 0730. No acute distress noted.   Pt A&Ox4.  Room air.   No tele.   Continent, BRP.   Ambulatory, independent after set up.   Regular diet, tolerating well so far.   NPO at MN for US abd tomorrow. Pt aware.   Meds per MAR. No c/o pain. No n/v/d.  Patient and family updated on POC.   Safety precautions in place.   Needs me at this time.     1830: Pt endorsed R flank pain. Hospitalist and Nephro notified. US Kidney ordered.     Problem: PAIN - ADULT  Goal: Verbalizes/displays adequate comfort level or patient's stated pain goal  Description: INTERVENTIONS:  - Encourage pt to monitor pain and request assistance  - Assess pain using appropriate pain scale  - Administer analgesics based on type and severity of pain and evaluate response  - Implement non-pharmacological measures as appropriate and evaluate response  - Consider cultural and social influences on pain and pain management  - Manage/alleviate anxiety  - Utilize distraction and/or relaxation techniques  - Monitor for opioid side effects  - Notify MD/LIP if interventions unsuccessful or patient reports new pain  - Anticipate increased pain with activity and pre-medicate as appropriate  Outcome: Progressing

## 2024-05-12 NOTE — PLAN OF CARE
Pt A&Ox4 Room Air  Sitting upright in Bed  Family at the bedside  No Tele Order in place.   Meds Given per Mar  Voids Freely  Monitoring Labs  NPO at Midnight EGD  Stool PCR pending.   Safety precaution Maintained  Plan of Care on-going       Problem: PAIN - ADULT  Goal: Verbalizes/displays adequate comfort level or patient's stated pain goal  Description: INTERVENTIONS:  - Encourage pt to monitor pain and request assistance  - Assess pain using appropriate pain scale  - Administer analgesics based on type and severity of pain and evaluate response  - Implement non-pharmacological measures as appropriate and evaluate response  - Consider cultural and social influences on pain and pain management  - Manage/alleviate anxiety  - Utilize distraction and/or relaxation techniques  - Monitor for opioid side effects  - Notify MD/LIP if interventions unsuccessful or patient reports new pain  - Anticipate increased pain with activity and pre-medicate as appropriate  Outcome: Progressing

## 2024-05-12 NOTE — OPERATIVE REPORT
Operative Report-Esophagogastroduodenoscopy with cold biopsies  Inge Barkley 6/1/2000   [facility] [id number] MRN [id number]   Admission Date 5/9/2024 Operation Date 5/12/2024   Attending Physician Otilio Yepez, * Operating Physician Yulisa North DO     PREOPERATIVE DIAGNOSIS/INDICATION: Vomiting, epigastric pain  POSTOPERTATIVE DIAGNOSIS: Antral erythema  PROCEDURE PERFORMED: EGD  INFORMED CONSENT: Once a brief history and physical examination was performed, the risks, benefits and alternatives to the procedure were discussed with the patient and/or family and informed consent was obtained.  The risks of sedation, perforation, missed lesions and need for surgery were all discussed.  Patient expressed understanding of the risks and agreed to proceed.    PROCEDURE DESCRIPTION:  The patient was then brought to the endoscopy suite where the patient's pulse, pulse oximetry and blood pressure were monitored. The pateint was placed in the left lateral decubitus position and deep sedation was administered. Once adequate sedation was achieved, a bite block was placed and a lubricated tip of an Olympus video upper endoscope was inserted through the oropharynx and gently manipulated through the esophagus into the stomach and the distal duodenum. Upon withdrawal of the endoscope, careful visualization of the mucosa was performed.   FINDINGS:  ESOPHAGUS: The Esophagus was normal.   EGJ: The SCJ was located at the GEJ, at 40 cm from the incisors, and was regular.   STOMACH: There was mild erythema in the antrum. There was some mild oozing with biopsies which stopped spontaneously.    DUODENUM: Normal.   THERAPEUTICS: Biopsies were performed from the antrum and body with a cold biopsy forceps to rule out H Pylori and from the duodenum to rule out celiac disease.   RECOMMENDATIONS:   Post EGD precautions, watch for bleeding, infection, perforation, adverse drug reactions   Follow biopsies.  PPI daily x 4 weeks   Avoid  NSAIDs   Recommend abdominal ultrasound  CC Report:     Yulisa North DO  5/12/2024  8:20 AM

## 2024-05-12 NOTE — ANESTHESIA PREPROCEDURE EVALUATION
PRE-OP EVALUATION    Patient Name: Inge Barkley    Admit Diagnosis: Cyclical vomiting [R11.15]  JAYY (acute kidney injury) (HCC) [N17.9]    Pre-op Diagnosis: Cyclical vomiting [R11.15]    ESOPHAGOGASTRODUODENOSCOPY (EGD)    Anesthesia Procedure: ESOPHAGOGASTRODUODENOSCOPY (EGD)    Surgeons and Role:     * Yulisa North, DO - Primary    Pre-op vitals reviewed.  Temp: 98.5 °F (36.9 °C)  Pulse: 42  Resp: 18  BP: 122/71  SpO2: 97 %  Body mass index is 21.63 kg/m².    Current medications reviewed.  Hospital Medications:   magnesium hydroxide (Milk of Magnesia) 400 MG/5ML oral suspension 30 mL  30 mL Oral Daily PRN    sennosides (Senokot) tab 17.2 mg  17.2 mg Oral BID    polyethylene glycol (PEG 3350) (Miralax) 17 g oral packet 17 g  17 g Oral Daily    pantoprazole (Protonix) 40 mg in sodium chloride 0.9% PF 10 mL IV push  40 mg Intravenous Q12H    [COMPLETED] ketorolac (Toradol) 15 MG/ML injection 15 mg  15 mg Intravenous Once    [COMPLETED] ondansetron (Zofran) 4 MG/2ML injection 4 mg  4 mg Intravenous Once    [COMPLETED] sodium chloride 0.9 % IV bolus 1,000 mL  1,000 mL Intravenous Once    [COMPLETED] pantoprazole (Protonix) 40 mg in sodium chloride 0.9% PF 10 mL IV push  40 mg Intravenous Once    acetaminophen (Tylenol Extra Strength) tab 1,000 mg  1,000 mg Oral Q4H PRN    melatonin tab 3 mg  3 mg Oral Nightly PRN    glycerin-hypromellose- (Artificial Tears) 0.2-0.2-1 % ophthalmic solution 1 drop  1 drop Both Eyes QID PRN    sodium chloride (Saline Mist) 0.65 % nasal solution 1 spray  1 spray Each Nare Q3H PRN    lactated ringers infusion   Intravenous Continuous    ondansetron (Zofran) 4 MG/2ML injection 4 mg  4 mg Intravenous Q6H PRN    prochlorperazine (Compazine) 10 MG/2ML injection 5 mg  5 mg Intravenous Q8H PRN    polyethylene glycol (PEG 3350) (Miralax) 17 g oral packet 17 g  17 g Oral Daily PRN    sennosides (Senokot) tab 17.2 mg  17.2 mg Oral Nightly PRN    bisacodyl (Dulcolax) 10 MG rectal  suppository 10 mg  10 mg Rectal Daily PRN    methocarbamol (Robaxin) partial tab 250 mg  250 mg Oral TID PRN    hyoscyamine sulfate (LEVSIN) disintegrating tab 0.125 mg  0.125 mg Sublingual Q4H PRN    traMADol (Ultram) tab 50 mg  50 mg Oral Q6H PRN    alum-mag hydroxide-simethicone (Maalox) 200-200-20 MG/5ML oral suspension 30 mL  30 mL Oral QID PRN    [COMPLETED] potassium chloride 40 mEq in 250mL sodium chloride 0.9% IVPB premix  40 mEq Intravenous Once       Outpatient Medications:     Medications Prior to Admission   Medication Sig Dispense Refill Last Dose    ondansetron 4 MG Oral Tablet Dispersible Take 1 tablet (4 mg total) by mouth every 8 (eight) hours as needed for Nausea.          Allergies: Patient has no known allergies.      Anesthesia Evaluation    Patient summary reviewed.    Anesthetic Complications  (-) history of anesthetic complications         GI/Hepatic/Renal    Negative GI/hepatic/renal ROS.                             Cardiovascular    Negative cardiovascular ROS.  ECG reviewed.  Exercise tolerance: good     MET: >4                                           Endo/Other    Negative endo/other ROS.                              Pulmonary    Negative pulmonary ROS.                       Neuro/Psych    Negative neuro/psych ROS.                                  History reviewed. No pertinent surgical history.  Social History     Socioeconomic History    Marital status: Single   Tobacco Use    Smoking status: Never    Smokeless tobacco: Never   Vaping Use    Vaping status: Never Used   Substance and Sexual Activity    Alcohol use: Not Currently    Drug use: Yes     Frequency: 3.0 times per week     Types: Cannabis     History   Drug Use    Frequency: 3.0 times per week    Types: Cannabis     Available pre-op labs reviewed.  Lab Results   Component Value Date    WBC 8.9 05/11/2024    RBC 3.81 05/11/2024    HGB 11.9 (L) 05/11/2024    HCT 34.7 (L) 05/11/2024    MCV 91.1 05/11/2024    MCH 31.2 05/11/2024     MCHC 34.3 05/11/2024    RDW 11.4 05/11/2024    .0 05/11/2024     Lab Results   Component Value Date     05/11/2024    K 3.8 05/11/2024     05/11/2024    CO2 22.0 05/11/2024    BUN 26 (H) 05/11/2024    CREATSERUM 2.33 (H) 05/11/2024     (H) 05/11/2024    CA 8.7 05/11/2024            Airway      Mallampati: II  Mouth opening: >3 FB  TM distance: 4 - 6 cm  Neck ROM: full Cardiovascular    Cardiovascular exam normal.  Rhythm: regular  Rate: normal  (-) murmur   Dental    Dentition appears grossly intact         Pulmonary    Pulmonary exam normal.  Breath sounds clear to auscultation bilaterally.               Other findings              ASA: 1   Plan: MAC  NPO status verified and patient meets guidelines.  Patient has not taken beta blockers in last 24 hours.  Post-procedure pain management plan discussed with surgeon and patient.      Plan/risks discussed with: patient                Present on Admission:  **None**

## 2024-05-13 ENCOUNTER — APPOINTMENT (OUTPATIENT)
Dept: ULTRASOUND IMAGING | Facility: HOSPITAL | Age: 24
DRG: 392 | End: 2024-05-13
Attending: INTERNAL MEDICINE

## 2024-05-13 ENCOUNTER — APPOINTMENT (OUTPATIENT)
Dept: ULTRASOUND IMAGING | Facility: HOSPITAL | Age: 24
DRG: 392 | End: 2024-05-13
Attending: STUDENT IN AN ORGANIZED HEALTH CARE EDUCATION/TRAINING PROGRAM

## 2024-05-13 ENCOUNTER — APPOINTMENT (OUTPATIENT)
Facility: HOSPITAL | Age: 24
DRG: 392 | End: 2024-05-13
Attending: STUDENT IN AN ORGANIZED HEALTH CARE EDUCATION/TRAINING PROGRAM

## 2024-05-13 PROBLEM — N18.9 CHRONIC KIDNEY DISEASE: Status: ACTIVE | Noted: 2024-05-13

## 2024-05-13 LAB
ANION GAP SERPL CALC-SCNC: 9 MMOL/L (ref 0–18)
BUN BLD-MCNC: 18 MG/DL (ref 9–23)
CALCIUM BLD-MCNC: 9 MG/DL (ref 8.5–10.1)
CHLORIDE SERPL-SCNC: 105 MMOL/L (ref 98–112)
CO2 SERPL-SCNC: 26 MMOL/L (ref 21–32)
CREAT BLD-MCNC: 2.28 MG/DL
DSDNA IGG SERPL IA-ACNC: 1.3 IU/ML
EGFRCR SERPLBLD CKD-EPI 2021: 30 ML/MIN/1.73M2 (ref 60–?)
ENA AB SER QL IA: 0.1 UG/L
ENA AB SER QL IA: NEGATIVE
GLUCOSE BLD-MCNC: 98 MG/DL (ref 70–99)
INR BLD: 1.06 (ref 0.8–1.2)
OSMOLALITY SERPL CALC.SUM OF ELEC: 292 MOSM/KG (ref 275–295)
POTASSIUM SERPL-SCNC: 3.7 MMOL/L (ref 3.5–5.1)
PROTHROMBIN TIME: 13.8 SECONDS (ref 11.6–14.8)
SODIUM SERPL-SCNC: 140 MMOL/L (ref 136–145)

## 2024-05-13 PROCEDURE — 93975 VASCULAR STUDY: CPT | Performed by: STUDENT IN AN ORGANIZED HEALTH CARE EDUCATION/TRAINING PROGRAM

## 2024-05-13 PROCEDURE — 99233 SBSQ HOSP IP/OBS HIGH 50: CPT | Performed by: INTERNAL MEDICINE

## 2024-05-13 PROCEDURE — 99232 SBSQ HOSP IP/OBS MODERATE 35: CPT | Performed by: STUDENT IN AN ORGANIZED HEALTH CARE EDUCATION/TRAINING PROGRAM

## 2024-05-13 PROCEDURE — 76775 US EXAM ABDO BACK WALL LIM: CPT | Performed by: STUDENT IN AN ORGANIZED HEALTH CARE EDUCATION/TRAINING PROGRAM

## 2024-05-13 PROCEDURE — 76700 US EXAM ABDOM COMPLETE: CPT | Performed by: INTERNAL MEDICINE

## 2024-05-13 NOTE — PROGRESS NOTES
Inpatient Follow up Note    Inge Barkley Patient Status:  Inpatient    2000 MRN [id number]   Location [facility] 3NE-A Attending Otilio Yepez, *   Hosp Day # 1 PCP Silva Ba MD     Reason for Consultation   Epigastric pain     Subjective   Reports epigastric pain has resolved, denies nausea/vomiting, tolerating solid food. Going for kidney biopsy tomorrow.              Objective:     /72 (BP Location: Left arm)   Pulse 50   Temp 97.6 °F (36.4 °C) (Oral)   Resp 18   Ht 5' 5\" (1.651 m)   Wt 130 lb (59 kg)   LMP 2024 (Approximate)   SpO2 96%   BMI 21.63 kg/m²   Gen: AAOx3  CV: RRR with normal S1 / S2  Resp: CTA bilaterally  Abd: (+)BS, soft, non-tender, non-distended; no rebound or guarding  Ext: No edema or cyanosis  Skin: Warm and dry     Labs/Imaging     LABRCNTIP[PGLU:5@  Recent Labs   Lab 24  1113   INR 1.06         Recent Labs   Lab 24  1736 05/10/24  0723 24  0850 24  0924   WBC 12.0* 10.2 8.9 8.7   HGB 13.8 11.6* 11.9* 12.0   .0 205.0 197.0 197.0       Recent Labs   Lab 24  1736 05/10/24  0723 24  0850 24  0924 24  0820    143 141 141 140   K 3.4* 3.8  3.8 3.8 3.6 3.7    114* 111 108 105   CO2 28.0 22.0 22.0 24.0 26.0   BUN 32* 33* 26* 22 18   CREATSERUM 2.27* 2.30* 2.33* 2.44* 2.28*       Recent Labs   Lab 24  1736   ALT 16   AST 16     US ABDOMEN COMPLETE (CPT=76700)    Result Date: 2024  CONCLUSION:  Echogenic renal parenchyma consistent with chronic renal parenchymal disease.   LOCATION:  [place]    Dictated by (CST): Edmund Finley MD on 2024 at 11:23 AM     Finalized by (CST): Edmund Finley MD on 2024 at 11:24 AM       US KIDNEY W DOPPLER (CVD=10243/49884)    Result Date: 2024  CONCLUSION:  No sonographic evidence of significant renal artery stenosis.   LOCATION:  [place]     Dictated by (CST): Edmund Finley MD on 2024 at 11:04 AM     Finalized by (CST):  Edmund Finley MD on 5/13/2024 at 11:07 AM      AST   Date/Time Value Ref Range Status   05/09/2024 05:36 PM 16 15 - 37 U/L Final     ALT   Date/Time Value Ref Range Status   05/09/2024 05:36 PM 16 13 - 56 U/L Final     BUN   Date/Time Value Ref Range Status   05/13/2024 08:20 AM 18 9 - 23 mg/dL Final       512 EGD: FINDINGS:  ESOPHAGUS: The Esophagus was normal.   EGJ: The SCJ was located at the GEJ, at 40 cm from the incisors, and was regular.   STOMACH: There was mild erythema in the antrum. There was some mild oozing with biopsies which stopped spontaneously.    DUODENUM: Normal.   THERAPEUTICS: Biopsies were performed from the antrum and body with a cold biopsy forceps to rule out H Pylori and from the duodenum to rule out celiac disease.        Assessment   Ms. Barkley is a 22 y/o with no significant PMHx who presents to the ED with reports of recurrent nausea and vomiting and epigastric pain, with CT and EGD not revealing for a cause of her symptoms; EGD histology pending. Ddx includes celiac disease, H pylori, cannabinoid hyperemesis syndrome, CVS. Her symptoms have resolved today.       Plan   -ok for regular diet from gi perspective  -marijuana cessation  -f/u EGD path  -we will arrange OP GI f/u in 2-4 weeks             Franky Velasquez MD  1:56 PM  5/13/2024  [facility]  [phone]

## 2024-05-13 NOTE — PROGRESS NOTES
[facility]   part of [facility]     Nephrology Progress Note    Inge Barkley Patient Status:  Inpatient    2000 MRN [id number]   Location [facility] 3NE-A Attending Otilio Yepez, *   Hosp Day # 1 PCP Silva Ba MD       SUBJECTIVE:  Noted to have IV infiltrate overnight, diarrhea persists        Physical Exam:   BP (!) 130/92 (BP Location: Left arm)   Pulse (!) 48   Temp 97.5 °F (36.4 °C) (Temporal)   Resp 18   Ht 5' 5\" (1.651 m)   Wt 130 lb (59 kg)   LMP 2024 (Approximate)   SpO2 96%   BMI 21.63 kg/m²   Temp (24hrs), Av.9 °F (36.6 °C), Min:97.5 °F (36.4 °C), Max:98.3 °F (36.8 °C)     No intake or output data in the 24 hours ending 24 0849  Last 3 Weights   24 1701 130 lb (59 kg)   24 1621 129 lb (58.5 kg)   21 1442 131 lb 3.2 oz (59.5 kg)   19 1444 125 lb (56.7 kg) (48%, Z= -0.05)*   19 1417 120 lb (54.4 kg) (38%, Z= -0.32)*     * Growth percentiles are based on CDC (Girls, 2-20 Years) data.     General: Alert and oriented in no apparent distress.  HEENT: No scleral icterus, MMM  Neck: Supple, no BRANDT or thyromegaly  Cardiac: Regular rate and rhythm, S1, S2 normal, no murmur or rub  Lungs: Clear without wheezes, rales, rhonchi.    Abdomen: Soft, non-tender. + bowel sounds, no palpable organomegaly  Extremities: Without clubbing, cyanosis or edema.  Neurologic: Alert and oriented, cranial nerves grossly intact, moving all extremities  Skin: Warm and dry, no rash        Labs:     Recent Labs   Lab 24  1736 05/10/24  0723 24  0850 24  0924   WBC 12.0* 10.2 8.9 8.7   HGB 13.8 11.6* 11.9* 12.0   MCV 91.9 91.5 91.1 89.3   .0 205.0 197.0 197.0       Recent Labs   Lab 24  1736 05/10/24  0723 24  0850 24  0924 24  0820    143 141 141 140   K 3.4* 3.8  3.8 3.8 3.6 3.7    114* 111 108 105   CO2 28.0 22.0 22.0 24.0 26.0   BUN 32* 33* 26* 22 18   CREATSERUM 2.27* 2.30* 2.33*  2.44* 2.28*   CA 9.3 8.8 8.7 8.8 9.0   MG  --  2.6 2.4 2.3  --    PHOS  --   --  3.4 4.6  --    * 88 103* 90 98       Recent Labs   Lab 05/09/24  1736   ALT 16   AST 16   ALB 4.0       No results for input(s): \"PGLU\" in the last 168 hours.    Meds:   Current Facility-Administered Medications   Medication Dose Route Frequency    lactated ringers infusion   Intravenous Continuous    SUMAtriptan (Imitrex) 20 MG/ACT nasal solution 1 spray  20 mg Nasal Q2H PRN    magnesium hydroxide (Milk of Magnesia) 400 MG/5ML oral suspension 30 mL  30 mL Oral Daily PRN    pantoprazole (Protonix) 40 mg in sodium chloride 0.9% PF 10 mL IV push  40 mg Intravenous Q12H    acetaminophen (Tylenol Extra Strength) tab 1,000 mg  1,000 mg Oral Q4H PRN    melatonin tab 3 mg  3 mg Oral Nightly PRN    glycerin-hypromellose- (Artificial Tears) 0.2-0.2-1 % ophthalmic solution 1 drop  1 drop Both Eyes QID PRN    sodium chloride (Saline Mist) 0.65 % nasal solution 1 spray  1 spray Each Nare Q3H PRN    ondansetron (Zofran) 4 MG/2ML injection 4 mg  4 mg Intravenous Q6H PRN    prochlorperazine (Compazine) 10 MG/2ML injection 5 mg  5 mg Intravenous Q8H PRN    polyethylene glycol (PEG 3350) (Miralax) 17 g oral packet 17 g  17 g Oral Daily PRN    sennosides (Senokot) tab 17.2 mg  17.2 mg Oral Nightly PRN    bisacodyl (Dulcolax) 10 MG rectal suppository 10 mg  10 mg Rectal Daily PRN    methocarbamol (Robaxin) partial tab 250 mg  250 mg Oral TID PRN    hyoscyamine sulfate (LEVSIN) disintegrating tab 0.125 mg  0.125 mg Sublingual Q4H PRN    traMADol (Ultram) tab 50 mg  50 mg Oral Q6H PRN    alum-mag hydroxide-simethicone (Maalox) 200-200-20 MG/5ML oral suspension 30 mL  30 mL Oral QID PRN         Impression/Plan:        JAYY- Cr normal as of 2021 labs (Care Everywhere). Unclear etiology. Patient had presented w/ persisent n/v/d x 1 week.  No sig nsaid use. Initially thought and treated as a pre-renal cause, but Cr has not improved w/ hydration.  Concern for intrinsic process such as ATN vs. Possible GN (especially given abnormal urine sediment with microhematuria and approx one gram protein)  Imaging does not show any evidence of obsructive uropathy- ? Cystitis  - creat still essentially unchanged despite IVF  - C3/C4 normal, check other serologies.  Will plan for renal bx today  N/V/D- improving; symptomatic tx- GI following; s/p EGD; PPI  Hx of migraines    Questions/concerns were discussed with patient and/or family by bedside.          Bruce Levine MD  5/13/2024  8:49 AM

## 2024-05-13 NOTE — PROGRESS NOTES
[facility]   part of [facility]     Hospitalist Progress Note     Inge Barkley Patient Status:  Observation    2000 MRN [id number]   Location [facility] 3NE-A Attending Lucho, Otilio Chen, *   Hosp Day # 1 PCP Silva Ba MD     Chief Complaint: Abdominal pain    Subjective:      - Pt notes pain in right flank when walking yesterday, states she gets this occasionally when not eating   - Notes she is sexually active with 1 male partner for 3-4 years, no prior hx PID/STIs, no current vaginal discharge or bleeding noted, partner not symptomatic either. Pain mostly located in epigastric and flank region, pain in lower abdomen is pinpoint in suprapubic region   - Tolerated diet yesterday without n/v, denies f/c, cp, sob    Objective:    Review of Systems:   A comprehensive review of systems was completed; pertinent positive and negatives stated in subjective.    Vital signs:  Temp:  [97.5 °F (36.4 °C)-98.3 °F (36.8 °C)] 97.5 °F (36.4 °C)  Pulse:  [41-53] 48  Resp:  [16-19] 18  BP: (100-141)/(58-95) 130/92  SpO2:  [94 %-100 %] 96 %    Physical Exam:    General: No acute distress  Respiratory: no wheezes, no rhonchi  Cardiovascular: S1, S2, regular rate and rhythm  Abdomen: Soft, mild ttp in epigastric region, no pain to deep palpation in RLQ or LLQ, no significant pain over suprapubic region. non-distended, positive bowel sounds  Neuro: No new focal deficits.   Extremities: no edema    Diagnostic Data:    Labs:  Recent Labs   Lab 05/09/24  1736 05/10/24  0723 24  0850 24  0924   WBC 12.0* 10.2 8.9 8.7   HGB 13.8 11.6* 11.9* 12.0   MCV 91.9 91.5 91.1 89.3   .0 205.0 197.0 197.0       Recent Labs   Lab 24  1736 05/10/24  0723 24  0850 24  0924   * 88 103* 90   BUN 32* 33* 26* 22   CREATSERUM 2.27* 2.30* 2.33* 2.44*   CA 9.3 8.8 8.7 8.8   ALB 4.0  --   --   --     143 141 141   K 3.4* 3.8  3.8 3.8 3.6    114* 111 108   CO2 28.0 22.0 22.0  24.0   ALKPHO 62  --   --   --    AST 16  --   --   --    ALT 16  --   --   --    BILT 0.7  --   --   --    TP 7.6  --   --   --        Estimated Creatinine Clearance: 32.3 mL/min (A) (based on SCr of 2.44 mg/dL (H)).    No results for input(s): \"TROP\", \"TROPHS\", \"CK\" in the last 168 hours.    No results for input(s): \"PTP\", \"INR\" in the last 168 hours.               Microbiology    No results found for this visit on 05/09/24.      Imaging: Reviewed in Epic.    Medications:    pantoprazole  40 mg Intravenous Q12H       Assessment & Plan:      # Intractable nausea/vomiting, resolving   # RLQ abdominal pain, resolved   - CT A/P with 1.8cm functional cyst in right ovary, with non-specific free fluid in pelvis   - No clear history findings c/f PID, no pain around lower pelvic region, will add on G/C swab, but low clinical suspicion at this time.    - GI consulted, s/p EGD with findings of antral erythma, continue on PPI   - US abd and renal with chronic renal disease noted, no JACQUIE or thrombosis   - now nausea improved, had multiple BM, backing off bowel reg   - Advised less excedrin use as outpt    - PPI, maalox      # Migraine HA   - d/w patient and family about f/u with neurology as outpt to consider longer term treatment for debilitating migraine HA    - imitrex PRN for abortive measures     # Functional ovarian cyst   - Gyne f/u as outpt     #JAYY   - Initially though 2/2 prerenal etiology, though not improved with IVF, now evaluating for intrinsic etiology   - Renal biopsy today   - Autoimmune studies per nephrology on consult    - Outpt nephrology f/u     Otilio Yepez MD    Supplementary Documentation:     Quality:  DVT Mechanical Prophylaxis:     Early ambuation  DVT Pharmacologic Prophylaxis   Medication   None                Code Status: Full Code  Burnett: No urinary catheter in place  Burnett Duration (in days):   Central line:    SHAKEEL:     The 21st Century Cures Act makes medical notes like these  available to patients in the interest of transparency. Please be advised this is a medical document. Medical documents are intended to carry relevant information, facts as evident, and the clinical opinion of the practitioner. The medical note is intended as peer to peer communication and may appear blunt or direct. It is written in medical language and may contain abbreviations or verbiage that are unfamiliar.

## 2024-05-13 NOTE — PLAN OF CARE
Assumed patient care @ 07:30.  Alert and oriented x 4.  Ambulatory/independent.  Room air.  No tele.  Denies pain medication.  Regular diet.  LR infusing at 100 ml./hr.  Safety fall precautions maintained.  Needs attended, call light within her reach.  Bed in lowest position.  Problem: Patient/Family Goals  Goal: Patient/Family Long Term Goal  Description: Patient's Long Term Goal: discharge to home.    Interventions:  - Nephro follows.  -GI follows.  - See additional Care Plan goals for specific interventions  5/13/2024 1549 by Marisol Araiza, RN  Outcome: Progressing  5/13/2024 1549 by Marisol Araiza, RN  Outcome: Progressing  Goal: Patient/Family Short Term Goal  Description: Patient's Short Term Goal: stable health condition.    Interventions:   - CLAUDIA of the abdomen.  -Doppler of the kidney.  - urine for culture.  -assessed for flank pain.  -assessed mental status.  -addressed her needs.    - See additional Care Plan goals for specific interventions  5/13/2024 1549 by Marisol Araiza, RN  Outcome: Progressing  5/13/2024 1549 by Marisol Araiza, RN  Outcome: Progressing

## 2024-05-13 NOTE — IMAGING NOTE
Received order for renal biopsy. Pt in US currently for imaging. Spoke with pt bedside RN Marisol House. She will obtain vitals upon return to unit, and will address BP if elevated. Pt has been NPO since midnight and will plan to review with Dr Yepez, NAEEM. Pt on no blood thinners.   After reviewing case with Dr Yepez, will plan to proceed tomorrow with the renal biopsy. Marisol House RN notified of this so patient could eat today and will make NPO at midnight and obtain PT/INR in AM.

## 2024-05-13 NOTE — PROGRESS NOTES
Assumed care at 1930  A&ox4  RA  No tele  Up ad nikki  Continent  PIV Infusing LR 100ml/hr  NPO 0000 ultrasound abd/doppler kidney  Safety precautions in place  All needs met at this time  Continue current POC    0125 PIV infiltrated. R elbow swollen. Ice packs applied. Md notified. No new orders.   RN attempted placing IV to L arm pt refusing IV placement at this time. MD aware

## 2024-05-14 ENCOUNTER — APPOINTMENT (OUTPATIENT)
Dept: ULTRASOUND IMAGING | Facility: HOSPITAL | Age: 24
DRG: 392 | End: 2024-05-14
Attending: INTERNAL MEDICINE

## 2024-05-14 VITALS
HEART RATE: 52 BPM | SYSTOLIC BLOOD PRESSURE: 119 MMHG | OXYGEN SATURATION: 95 % | DIASTOLIC BLOOD PRESSURE: 83 MMHG | BODY MASS INDEX: 21.66 KG/M2 | HEIGHT: 65 IN | TEMPERATURE: 98 F | WEIGHT: 130 LBS | RESPIRATION RATE: 21 BRPM

## 2024-05-14 LAB
ALBUMIN SERPL-MCNC: 3.4 G/DL (ref 3.4–5)
ALBUMIN/GLOB SERPL: 1.1 {RATIO} (ref 1–2)
ALP LIVER SERPL-CCNC: 52 U/L
ALT SERPL-CCNC: 22 U/L
ANION GAP SERPL CALC-SCNC: 5 MMOL/L (ref 0–18)
ANTI-MPO ANTIBODIES: <0.2 UNITS
ANTI-PR3 ANTIBODIES: <0.2 UNITS
AST SERPL-CCNC: 13 U/L (ref 15–37)
BILIRUB SERPL-MCNC: 0.8 MG/DL (ref 0.1–2)
BUN BLD-MCNC: 17 MG/DL (ref 9–23)
C TRACH DNA SPEC QL NAA+PROBE: NEGATIVE
CALCIUM BLD-MCNC: 8.6 MG/DL (ref 8.5–10.1)
CHLORIDE SERPL-SCNC: 108 MMOL/L (ref 98–112)
CO2 SERPL-SCNC: 29 MMOL/L (ref 21–32)
CREAT BLD-MCNC: 2.01 MG/DL
EGFRCR SERPLBLD CKD-EPI 2021: 35 ML/MIN/1.73M2 (ref 60–?)
GLOBULIN PLAS-MCNC: 3 G/DL (ref 2.8–4.4)
GLUCOSE BLD-MCNC: 98 MG/DL (ref 70–99)
INR BLD: 1.14 (ref 0.8–1.2)
N GONORRHOEA DNA SPEC QL NAA+PROBE: NEGATIVE
OSMOLALITY SERPL CALC.SUM OF ELEC: 296 MOSM/KG (ref 275–295)
POTASSIUM SERPL-SCNC: 3.9 MMOL/L (ref 3.5–5.1)
PROT SERPL-MCNC: 6.4 G/DL (ref 6.4–8.2)
PROTHROMBIN TIME: 14.7 SECONDS (ref 11.6–14.8)
SODIUM SERPL-SCNC: 142 MMOL/L (ref 136–145)

## 2024-05-14 PROCEDURE — 99239 HOSP IP/OBS DSCHRG MGMT >30: CPT | Performed by: STUDENT IN AN ORGANIZED HEALTH CARE EDUCATION/TRAINING PROGRAM

## 2024-05-14 PROCEDURE — 0TB13ZX EXCISION OF LEFT KIDNEY, PERCUTANEOUS APPROACH, DIAGNOSTIC: ICD-10-PCS | Performed by: RADIOLOGY

## 2024-05-14 PROCEDURE — 76942 ECHO GUIDE FOR BIOPSY: CPT | Performed by: INTERNAL MEDICINE

## 2024-05-14 PROCEDURE — 50200 RENAL BIOPSY PERQ: CPT | Performed by: INTERNAL MEDICINE

## 2024-05-14 PROCEDURE — 99152 MOD SED SAME PHYS/QHP 5/>YRS: CPT | Performed by: INTERNAL MEDICINE

## 2024-05-14 PROCEDURE — 99232 SBSQ HOSP IP/OBS MODERATE 35: CPT | Performed by: INTERNAL MEDICINE

## 2024-05-14 RX ORDER — FLUMAZENIL 0.1 MG/ML
INJECTION INTRAVENOUS
Status: DISCONTINUED
Start: 2024-05-14 | End: 2024-05-14

## 2024-05-14 RX ORDER — ONDANSETRON 2 MG/ML
INJECTION INTRAMUSCULAR; INTRAVENOUS
Status: DISCONTINUED
Start: 2024-05-14 | End: 2024-05-14

## 2024-05-14 RX ORDER — NALOXONE HYDROCHLORIDE 0.4 MG/ML
INJECTION, SOLUTION INTRAMUSCULAR; INTRAVENOUS; SUBCUTANEOUS
Status: DISCONTINUED
Start: 2024-05-14 | End: 2024-05-14

## 2024-05-14 RX ORDER — MIDAZOLAM HYDROCHLORIDE 1 MG/ML
INJECTION INTRAMUSCULAR; INTRAVENOUS
Status: DISCONTINUED
Start: 2024-05-14 | End: 2024-05-14

## 2024-05-14 RX ORDER — MIDAZOLAM HYDROCHLORIDE 1 MG/ML
1 INJECTION INTRAMUSCULAR; INTRAVENOUS EVERY 5 MIN PRN
Status: ACTIVE | OUTPATIENT
Start: 2024-05-14 | End: 2024-05-14

## 2024-05-14 RX ORDER — SODIUM CHLORIDE 9 MG/ML
INJECTION, SOLUTION INTRAVENOUS CONTINUOUS
Status: DISCONTINUED | OUTPATIENT
Start: 2024-05-14 | End: 2024-05-14

## 2024-05-14 RX ORDER — FLUMAZENIL 0.1 MG/ML
0.2 INJECTION INTRAVENOUS AS NEEDED
Status: DISCONTINUED | OUTPATIENT
Start: 2024-05-14 | End: 2024-05-14

## 2024-05-14 RX ORDER — NALOXONE HYDROCHLORIDE 0.4 MG/ML
80 INJECTION, SOLUTION INTRAMUSCULAR; INTRAVENOUS; SUBCUTANEOUS AS NEEDED
Status: DISCONTINUED | OUTPATIENT
Start: 2024-05-14 | End: 2024-05-14

## 2024-05-14 NOTE — IMAGING NOTE
Planning for 1100 procedure in US. Tati RN at bedside made aware. Pt NPO since midnight, labs back, pt to be consented in department.

## 2024-05-14 NOTE — IMAGING NOTE
US Renal Biopsy procedure completed.   Obtained biopsy. Specimen sent to Pathology.    Presently denies pain. Tolerated procedure well.   Transported pt to Rm [id number] accompanied by Fuentes DISLA. Bedside report given to Tati DISLA.

## 2024-05-14 NOTE — H&P
[facility]   part of [facility]   History & Physical    Inge Barkley Patient Status:  Inpatient    2000 MRN [id number]   Location [facility] 3NE-A Attending Otilio Yepez, *   Hosp Day # 2 PCP Silva Ba MD     Admitting Diagnosis:   JAYY    History of Present Illness:   24 yo F w/ JAYY presents for kidney core biopsy    History   Past Medical History:  Past Medical History:    Intractable nausea and vomiting       Past Surgical History:  History reviewed. No pertinent surgical history.    Social History:  Social History     Tobacco Use    Smoking status: Never    Smokeless tobacco: Never   Substance Use Topics    Alcohol use: Not Currently        Family History:  No family history on file.    Allergies/Medications:   Allergies:  No Known Allergies    Medications:    Current Outpatient Medications:     pantoprazole 40 MG Oral Tab EC, Take one tablet (40 mg total) by mouth once daily, 30 minutes prior to breakfast., Disp: 30 tablet, Rfl: 0    pantoprazole 40 MG Oral Tab EC, Take one tablet (40 mg total) by mouth once daily, 30 minutes prior to breakfast., Disp: 90 tablet, Rfl: 0    Physical Exam & Review of Systems:   Physical Exam:    BP (!) 141/91   Pulse (!) 44   Temp 97.7 °F (36.5 °C) (Axillary)   Resp 11   Ht 65\"   Wt 130 lb   LMP 2024 (Approximate)   SpO2 100%   BMI 21.63 kg/m²     General: NAD  Neck: No JVD  Lungs: CTA bilat  Heart: RRR, S1, S2  Abdomen: Soft, NT/ND, BS+x4  Extremities: Warm, dry, no LE edema bilat  Pulses: 2+ bilat DP    Results:   Labs:  Recent Labs   Lab 05/10/24  0723 24  0850 24  0924   RBC 3.65* 3.81 3.75*   HGB 11.6* 11.9* 12.0   HCT 33.4* 34.7* 33.5*   MCV 91.5 91.1 89.3   MCH 31.8 31.2 32.0   MCHC 34.7 34.3 35.8   RDW 11.7 11.4 11.2   NEPRELIM 7.00 6.39 6.11   WBC 10.2 8.9 8.7   .0 197.0 197.0     Recent Labs   Lab 24  1113 24  0706   PTP 13.8 14.7   INR 1.06 1.14     Recent Labs   Lab 24  0924  05/13/24  0820 05/14/24  0706   GLU 90 98 98   BUN 22 18 17   CREATSERUM 2.44* 2.28* 2.01*   CA 8.8 9.0 8.6    140 142   K 3.6 3.7 3.9    105 108   CO2 24.0 26.0 29.0       Assessment/Plan:   Impression: 22 yo F JAYY for kidney core biopsy    I have discussed with the patient and/or legal representative the potential benefits, risks, and side effects of this procedure, the likelihood of the patient achieving goals; and the potential problems that might occur during recuperation.  I discussed reasonable alternatives to the procedure, including risks, benefits and side effects related to the alternatives, and risks related to not receiving this procedure.      Recommendations: US guided core biopsy of the L kidney    Randolph Mckeon MD  5/14/2024  11:41 AM

## 2024-05-14 NOTE — PROGRESS NOTES
[facility]      Nephrology Progress Note    Inge Barkley Patient Status:  Inpatient    2000 MRN [id number]   [facility] 3NE-A Attending Otilio Yepez, *   Hosp Day # 2 PCP Silva Ba MD       SUBJECTIVE:  Some R side back/flank pain  No f/c  No n/v  Good UOP      Current Facility-Administered Medications:     sodium chloride 0.9% infusion, , Intravenous, Continuous    midazolam (Versed) 2 MG/2ML injection 1 mg, 1 mg, Intravenous, Q5 Min PRN    fentaNYL (Sublimaze) 50 mcg/mL injection 50 mcg, 50 mcg, Intravenous, Q5 Min PRN    naloxone (Narcan) 0.4 MG/ML injection 80 mcg, 80 mcg, Intravenous, PRN    flumazenil (Romazicon) 0.5 mg/5mL injection 0.2 mg, 0.2 mg, Intravenous, PRN    fentaNYL (Sublimaze) 50 mcg/mL injection, , ,     midazolam (Versed) 2 MG/2ML injection, , ,     naloxone (Narcan) 0.4 MG/ML injection, , ,     flumazenil (Romazicon) 0.5 mg/5mL injection, , ,     ondansetron (Zofran) 4 MG/2ML injection, , ,     pantoprazole (Protonix) 40 mg in sodium chloride 0.9% PF 10 mL IV push, 40 mg, Intravenous, Q24H    lactated ringers infusion, , Intravenous, Continuous    SUMAtriptan (Imitrex) 20 MG/ACT nasal solution 1 spray, 20 mg, Nasal, Q2H PRN    magnesium hydroxide (Milk of Magnesia) 400 MG/5ML oral suspension 30 mL, 30 mL, Oral, Daily PRN    acetaminophen (Tylenol Extra Strength) tab 1,000 mg, 1,000 mg, Oral, Q4H PRN    melatonin tab 3 mg, 3 mg, Oral, Nightly PRN    glycerin-hypromellose- (Artificial Tears) 0.2-0.2-1 % ophthalmic solution 1 drop, 1 drop, Both Eyes, QID PRN    sodium chloride (Saline Mist) 0.65 % nasal solution 1 spray, 1 spray, Each Nare, Q3H PRN    ondansetron (Zofran) 4 MG/2ML injection 4 mg, 4 mg, Intravenous, Q6H PRN    prochlorperazine (Compazine) 10 MG/2ML injection 5 mg, 5 mg, Intravenous, Q8H PRN    polyethylene glycol (PEG 3350) (Miralax) 17 g oral packet 17 g, 17 g, Oral, Daily PRN    sennosides (Senokot) tab 17.2 mg, 17.2 mg, Oral,  Nightly PRN    bisacodyl (Dulcolax) 10 MG rectal suppository 10 mg, 10 mg, Rectal, Daily PRN    methocarbamol (Robaxin) partial tab 250 mg, 250 mg, Oral, TID PRN    hyoscyamine sulfate (LEVSIN) disintegrating tab 0.125 mg, 0.125 mg, Sublingual, Q4H PRN    traMADol (Ultram) tab 50 mg, 50 mg, Oral, Q6H PRN    alum-mag hydroxide-simethicone (Maalox) 200-200-20 MG/5ML oral suspension 30 mL, 30 mL, Oral, QID PRN          Physical Exam:   BP (!) 128/93 (BP Location: Left arm)   Pulse 65   Temp 97.7 °F (36.5 °C) (Axillary)   Resp 18   Ht 5' 5\" (1.651 m)   Wt 130 lb (59 kg)   LMP 2024 (Approximate)   SpO2 99%   BMI 21.63 kg/m²   Temp (24hrs), Av.8 °F (36.6 °C), Min:97.6 °F (36.4 °C), Max:98 °F (36.7 °C)     No intake or output data in the 24 hours ending 24 1050  Last 3 Weights   24 1701 130 lb (59 kg)   24 1621 129 lb (58.5 kg)   21 1442 131 lb 3.2 oz (59.5 kg)   19 1444 125 lb (56.7 kg) (48%, Z= -0.05)*   19 1417 120 lb (54.4 kg) (38%, Z= -0.32)*     * Growth percentiles are based on CDC (Girls, 2-20 Years) data.     General: Alert and oriented in no apparent distress.  HEENT: No scleral icterus, MMM  Neck: Supple, no BRANDT or thyromegaly  Cardiac: Regular rate and rhythm, S1, S2 normal, no murmur or rub  Lungs: Clear without wheezes, rales, rhonchi.    Abdomen: Soft, non-tender. + bowel sounds, no palpable organomegaly  Extremities: Without clubbing, cyanosis or edema.  Neurologic: Alert and oriented, cranial nerves grossly intact, moving all extremities  Skin: Warm and dry, no rash        Recent Labs     24  0924 24  1113 24  0706   WBC 8.7  --   --    HGB 12.0  --   --    MCV 89.3  --   --    .0  --   --    INR  --  1.06 1.14       Recent Labs     24  0924  0820 24  0706    140 142   K 3.6 3.7 3.9    105 108   CO2 24.0 26.0 29.0   BUN 22 18 17   CREATSERUM 2.44* 2.28* 2.01*   CA 8.8 9.0 8.6   MG 2.3  --   --     PHOS 4.6  --   --        Recent Labs     05/14/24  0706   ALT 22   AST 13*   ALB 3.4         Impression/Plan:        JAYY- Cr normal as of 2021 labs (Care Everywhere). Unclear etiology. Patient had presented w/ persisent n/v/d x 1 week.  No sig nsaid use. Initially thought and treated as a pre-renal cause, but Cr has not improved w/ hydration. Concern for intrinsic process such as ATN vs. Possible GN (especially given abnormal urine sediment with microhematuria and approx one gram protein)  Imaging does not show any evidence of obsructive uropathy- ? Cystitis  - C3/C4 normal, check other serologies.  Will plan for renal bx today  N/V/D- improving; symptomatic tx- GI following; s/p EGD; PPI  Hx of migraines  Back/flank pain- abnl imaging of R ovary- ? PID- cultures pending     Questions/concerns were discussed with patient and/or family by bedside.    Saad Horton  5/14/2024

## 2024-05-14 NOTE — PROGRESS NOTES
[facility]   part of [facility]     Hospitalist Progress Note     Inge Calvoryanne Patient Status:  Observation    2000 MRN [id number]   Location [facility] 3NE-A Attending Lucho, Otilio Chen, *   Hosp Day # 2 PCP Silva Ba MD     Chief Complaint: Abdominal pain    Subjective:      - Pt seen following renal biopsy, states that today nausea and abdominal pain now completely resolved. Tolerating soft diet without any issues   - Feels slight discomfort around right ovary site but improved as well, states she has outpt Gyne appt scheduled for next week   - Denies other f/c, cp, sob     Objective:    Review of Systems:   A comprehensive review of systems was completed; pertinent positive and negatives stated in subjective.    Vital signs:  Temp:  [97.6 °F (36.4 °C)-98 °F (36.7 °C)] 97.7 °F (36.5 °C)  Pulse:  [50-65] 65  Resp:  [18] 18  BP: (117-128)/(72-93) 128/93  SpO2:  [96 %-99 %] 99 %    Physical Exam:    General: No acute distress  Respiratory: no wheezes, no rhonchi  Cardiovascular: S1, S2, regular rate and rhythm  Abdomen: Soft, nontender, no significant pain over suprapubic region. non-distended, positive bowel sounds  Neuro: No new focal deficits.   Extremities: no edema    Diagnostic Data:    Labs:  Recent Labs   Lab 05/09/24  1736 05/10/24  0723 24  0850 24  0924 24  1113 24  0706   WBC 12.0* 10.2 8.9 8.7  --   --    HGB 13.8 11.6* 11.9* 12.0  --   --    MCV 91.9 91.5 91.1 89.3  --   --    .0 205.0 197.0 197.0  --   --    INR  --   --   --   --  1.06 1.14       Recent Labs   Lab 05/09/24  1736 05/10/24  0723 24  0850 24  0924 24  0820   *   < > 103* 90 98   BUN 32*   < > 26* 22 18   CREATSERUM 2.27*   < > 2.33* 2.44* 2.28*   CA 9.3   < > 8.7 8.8 9.0   ALB 4.0  --   --   --   --       < > 141 141 140   K 3.4*   < > 3.8 3.6 3.7      < > 111 108 105   CO2 28.0   < > 22.0 24.0 26.0   ALKPHO 62  --   --   --   --    AST  16  --   --   --   --    ALT 16  --   --   --   --    BILT 0.7  --   --   --   --    TP 7.6  --   --   --   --     < > = values in this interval not displayed.       Estimated Creatinine Clearance: 34.5 mL/min (A) (based on SCr of 2.28 mg/dL (H)).    No results for input(s): \"TROP\", \"TROPHS\", \"CK\" in the last 168 hours.    Recent Labs   Lab 05/13/24  1113 05/14/24  0706   PTP 13.8 14.7   INR 1.06 1.14                  Microbiology    No results found for this visit on 05/09/24.      Imaging: Reviewed in Epic.    Medications:    pantoprazole  40 mg Intravenous Q24H       Assessment & Plan:      # Intractable nausea/vomiting, resolved    # RLQ abdominal pain, resolved   - CT A/P with 1.8cm functional cyst in right ovary, with non-specific free fluid in pelvis   - No clear history findings c/f PID, no pain around lower pelvic region, G/C testing negative, and low clinical suspicion for PID at this time    - GI consulted, s/p EGD with findings of antral erythma, continue on PPI; plan for outpt GI f/u   - US abd and renal with chronic renal disease noted, no JACQUIE or thrombosis   - now nausea improved, had multiple BM, backing off bowel reg   - Advised less excedrin use as outpt    - PPI, maalox      # Migraine HA   - d/w patient and family about f/u with neurology as outpt to consider longer term treatment for debilitating migraine HA    - imitrex PRN for abortive measures     # Functional ovarian cyst   - Gyne f/u as outpt     #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  #JAYY   - Initially though 2/2 prerenal etiology, though not improved with IVF, now evaluating for intrinsic etiology   - Renal biopsy completed   - Autoimmune studies per nephrology on consult    - Outpt nephrology f/u     DC home later today if tolerating diet and cleared by all consultants     Otilio Yepez MD    Supplementary Documentation:     Quality:  DVT Mechanical Prophylaxis:     Early ambuation  DVT Pharmacologic Prophylaxis   Medication   None                 Code Status: Full Code  Burnett: No urinary catheter in place  Burnett Duration (in days):   Central line:    SHAKEEL:     The 21st Century Cures Act makes medical notes like these available to patients in the interest of transparency. Please be advised this is a medical document. Medical documents are intended to carry relevant information, facts as evident, and the clinical opinion of the practitioner. The medical note is intended as peer to peer communication and may appear blunt or direct. It is written in medical language and may contain abbreviations or verbiage that are unfamiliar.

## 2024-05-14 NOTE — PROCEDURES
[facility]   part of [facility]  Procedure Note    Inge Barkley Patient Status:  Inpatient    2000 MRN [id number]   Location [facility] 3NE-A Attending Otilio Yepez, *   Hosp Day # 2 PCP Silva Ba MD     Procedure: US kidney core biopsy L     Pre-Procedure Diagnosis:  JAYY    Post-Procedure Diagnosis: Same    Anesthesia:  Local and Sedation    Findings:  2 x 18g core biopsy of the L kidney    Specimens: As above    Blood Loss:  Minimal    Tourniquet Time: None  Complications:  None  Drains:  None    Secondary Diagnosis:  None    Randolph Mckeon MD  2024

## 2024-05-14 NOTE — PROGRESS NOTES
Assumed care at 1930  A&ox4  RA  No tele orders  Continent  NPO 0000  Denies pain at this time  Up ad nikki  PIV infusing LR 100ml/hr  Renal biopsy tomorrow   Safety precautions in place  All needs met at this time  Continue current POC

## 2024-05-15 LAB — ANTI-PLA2R: <1.8 RU/ML

## 2024-05-16 ENCOUNTER — MED REC SCAN ONLY (OUTPATIENT)
Dept: NEPHROLOGY | Facility: CLINIC | Age: 24
End: 2024-05-16

## 2024-05-16 ENCOUNTER — TELEPHONE (OUTPATIENT)
Dept: NEPHROLOGY | Facility: CLINIC | Age: 24
End: 2024-05-16

## 2024-05-16 NOTE — DISCHARGE SUMMARY
[place] HOSPITALIST  DISCHARGE SUMMARY     Inge Barkley Patient Status:  Inpatient    2000 MRN [id number]   Location [facility] 3NE-A Attending Leena Yepez, DO   Hosp Day # 2 PCP Silva Ba MD     Date of Admission: 2024  Date of Discharge: 2024  Discharge Disposition: Home or Self Care    Admitting Diagnosis:   Cyclical vomiting [R11.15]  JAYY (acute kidney injury) (HCC) [N17.9]    Hospital Discharge Diagnoses:   Intractable nausea and vomiting  Suspected intrinsic renal disease  CKD  Migraine headaches  Functional ovarian cyst      Lace+ Score: 19  59-90 High Risk  29-58 Medium Risk  0-28   Low Risk.    TCM Follow-Up Recommendation:  LACE < 29: Low Risk of readmission after discharge from the hospital; Still recommend for TCM follow-up.        Discharge Diagnosis:   Intractable nausea and vomiting    History of Present Illness: Inge Barkley is a 23 year old female with no significant past medical history.  She has had 2 days of nausea and vomiting with lower abdominal and epigastric discomfort.  She was the emergency room on  at an outside hospital where she had a pelvic ultrasound that showed mild to moderate pelvic fluid and a large right ovarian cyst.  She was discharged home but continued to have symptoms which she came to the emergency room here.  The patient does admit to using marijuana though has never had nausea vomiting within the past.  She does endorse having some intermittent chills but denies diarrhea.  She has had persistent nausea and vomiting.      Brief Synopsis: Patient initially presented with intractable nausea and vomiting, CT abdomen pelvis, nonspecific free fluid in pelvis, no other acute intra-abdominal abnormalities.  Initially trialed on IV fluids, antiemetics as needed.  Patient with slow improvement, GI consulted, completed EGD with findings of antral erythema, recommended continuing PPI with outpatient GI follow-up.  Ultrasound abdomen completed  without additional findings noted.  Patient with no clear history or findings concerning for PID, GC testing sent and negative, low clinical suspicion for PID at this time.  Eventually patient had gradual resolution of nausea and vomiting and able to tolerate soft diet at discharge.  Patient incidentally also noted to have elevated creatinine during admission, initially thought secondary to prerenal etiology however not improved with IV fluids and thus nephrology consulted, lab work sent for evaluation for intrinsic etiologies of renal disease, renal biopsy completed.  Patient to follow-up outpatient with nephrology for review of biopsy results and further management.    Procedures during hospitalization:   Left renal biopsy by IR    Incidental or significant findings and recommendations (brief descriptions):  New CKD, suspected intrinsic renal disease, status post renal biopsy by IR, follow-up with nephrology for further workup and management  Intractable nausea and vomiting, unspecified etiology, EGD with antral erythema, continue PPI and outpatient GI follow-up  Encouraged marijuana cessation as this could be contributing to nausea and vomiting  History of significant migraine headaches, approximately 1-2 times a week, recommended outpatient neurology follow-up for management of chronic migraines  Outpatient gynecology follow-up for functional ovarian cysts    Lab/Test results pending at Discharge:   None    Consultants:  Nephrology, gastroenterology    Discharge Medication List:     Discharge Medications        START taking these medications        Instructions Prescription details   pantoprazole 40 MG Tbec  Commonly known as: Protonix      Take one tablet (40 mg total) by mouth once daily, 30 minutes prior to breakfast.   Quantity: 30 tablet  Refills: 0            CONTINUE taking these medications        Instructions Prescription details   ondansetron 4 MG Tbdp  Commonly known as: Zofran-ODT      Take 1 tablet  (4 mg total) by mouth every 8 (eight) hours as needed for Nausea.   Refills: 0               Where to Get Your Medications        These medications were sent to [id number] - [place], [place] - [address] AT [facility] [address], [phone], [phone]  [address], [facility] [address]      Hours: 24-hours Phone: [phone]   pantoprazole 40 MG [facility]         [place]    Follow-up appointment:   Yulisa North DO  [address] [address]  [phone]    Follow up in 8 week(s)      Ritesh Cheng DO  [address] [facility]  [address]  [place] [address]  [phone]    Call in 1 week(s)  Establish care for migraine headaches    Saad Horton MD  [address] [facility] [address]  [place] [address]  [phone]    Call in 1 week(s)  Establish care    Silva Ba MD  [address] [facility] [address]  [phone]    Call in 1 week(s)  For routine follow-up after hospitilization      Vital signs:       Physical Exam:  See exam from day of discharge note  -----------------------------------------------------------------------------------------------  PATIENT DISCHARGE INSTRUCTIONS: See electronic chart    Otilio Yepez MD 5/16/2024    Time spent:  40 minutes

## (undated) DEVICE — BITEBLOCK ENDOSCP 60FR MAXI STRP

## (undated) DEVICE — 3M™ RED DOT™ MONITORING ELECTRODE WITH FOAM TAPE AND STICKY GEL 2570-3, 3/BAG, 200/CASE, 54/PLT: Brand: RED DOT™

## (undated) DEVICE — ADAPTER SPIKE DUAL DAVOL 15010

## (undated) DEVICE — 3M™ STERI-STRIP™ REINFORCED ADHESIVE SKIN CLOSURES, R1547, 1/2 IN X 4 IN (12 MM X 100 MM), 6 STRIPS/ENVELOPE: Brand: 3M™ STERI-STRIP™

## (undated) DEVICE — KENDALL SCD EXPRESS SLEEVES, KNEE LENGTH, MEDIUM: Brand: KENDALL SCD

## (undated) DEVICE — DEVICE FLUID REMOVAL-WATER BUG

## (undated) DEVICE — SUTURE NABSB OTHCRD 2 OS-6

## (undated) DEVICE — SUTURE VICRYL 2-0 FS-1

## (undated) DEVICE — ZIMMER® STERILE DISPOSABLE TOURNIQUET CUFF WITH PLC, DUAL PORT, SINGLE BLADDER, 34 IN. (86 CM)

## (undated) DEVICE — OCCLUSIVE GAUZE STRIP OVERWRAP,3% BISMUTH TRIBROMOPHENATE IN PETROLATUM BLEND: Brand: XEROFORM

## (undated) DEVICE — 10FT COMBINED O2 DELIVERY/CO2 MONITORING. FILTER WITH MICROSTREAM TYPE LUER: Brand: DUAL ADULT NASAL CANNULA

## (undated) DEVICE — STERILE POLYISOPRENE POWDER-FREE SURGICAL GLOVES: Brand: PROTEXIS

## (undated) DEVICE — GIJAW SINGLE-USE BIOPSY FORCEPS WITH NEEDLE: Brand: GIJAW

## (undated) DEVICE — KIT VLV 5 PC AIR H2O SUCT BX ENDOGATOR CONN

## (undated) DEVICE — RESTORE DRILL POINT PIN WITH EYELET .094 X 14 INCHES (2.39MM X 35.6CM): Brand: RESTORE

## (undated) DEVICE — SUTURE NABSB OTHCRD VIOL TIE

## (undated) DEVICE — SUTURE FIBERWIRE 2 AR-7202

## (undated) DEVICE — SUTURE FIBERWIRE S AR-7200

## (undated) DEVICE — Device

## (undated) DEVICE — KNEE ARTHROSCOPY CDS: Brand: MEDLINE INDUSTRIES, INC.

## (undated) DEVICE — 1200CC GUARDIAN II: Brand: GUARDIAN

## (undated) DEVICE — [RESECTOR CUTTER, ARTHROSCOPIC SHAVER BLADE,  DO NOT RESTERILIZE,  DO NOT USE IF PACKAGE IS DAMAGED,  KEEP DRY,  KEEP AWAY FROM SUNLIGHT]: Brand: FORMULA

## (undated) DEVICE — PADDING CAST COTTON  4

## (undated) DEVICE — KIT ACL TRANS AR-1898S

## (undated) DEVICE — SUPER SPONGES,MEDIUM: Brand: KERLIX

## (undated) DEVICE — POLAR CARE CUBE COOLING UNIT

## (undated) DEVICE — CAUTERY PENCIL

## (undated) DEVICE — PRECISION W/10.0MM STOP (9.2 X 0.51 X 18.4MM)

## (undated) DEVICE — SOL LACT RINGERS 3000ML

## (undated) DEVICE — SUTURE NABSB OTHCRD 36IN 2

## (undated) DEVICE — GUID PIN ORTH 3MM KNEE FEM TIB

## (undated) DEVICE — SUTURE MONOCRYL 4-0 PS-2

## (undated) DEVICE — STERILE PATIENT PROTECTIVE PAD FOR IMP® KNEE POSITIONERS & COHESIVE WRAP (10 / CASE): Brand: DE MAYO KNEE POSITIONER®

## (undated) DEVICE — HOOK LOCK LATEX FREE ELASTIC BANDAGE 4INX5YD

## (undated) DEVICE — REAMER, LOW PROFILE

## (undated) DEVICE — [AGGRESSIVE PLUS CUTTER, ARTHROSCOPIC SHAVER BLADE,  DO NOT RESTERILIZE,  DO NOT USE IF PACKAGE IS DAMAGED,  KEEP DRY,  KEEP AWAY FROM SUNLIGHT]: Brand: FORMULA

## (undated) DEVICE — BANDAGE ROLL,100% COTTON, 6 PLY, LARGE: Brand: KERLIX

## (undated) DEVICE — PIN DRL 4MM RETROBUTTON TGHTRP

## (undated) DEVICE — SUTURE FIBERTAPE #2 AR-7237

## (undated) DEVICE — ABDOMINAL PAD: Brand: DERMACEA

## (undated) DEVICE — KIT CUSTOM ENDOPROCEDURE STERIS

## (undated) DEVICE — PAD POLAR CARE KNEE/SHOULDER

## (undated) DEVICE — ACL GRAFT KNIFE 10MM

## (undated) DEVICE — ACL ADD ON PACK-LF: Brand: MEDLINE INDUSTRIES, INC.

## (undated) DEVICE — NITINOL GUIDEWIRE 1.1MM X 38.1CM (15 INCHES)

## (undated) DEVICE — SUTURE VICRYL 0 CP-2

## (undated) DEVICE — #15 STERILE STAINLESS BLADE: Brand: STERILE STAINLESS BLADES

## (undated) DEVICE — MASK,FACE,MAXFLUIDPROTECT,SHIELD/TIES: Brand: MEDLINE

## (undated) DEVICE — ADHESIVE MASTISOL 2/3CC VL

## (undated) DEVICE — 10K/24K ARTHROSCOPY INFLOW TUBE SET: Brand: 10K/24K

## (undated) DEVICE — #11 STERILE BLADE: Brand: POLYMER COATED BLADES

## (undated) DEVICE — PREMIUM WET SKIN PREP TRAY: Brand: MEDLINE INDUSTRIES, INC.

## (undated) NOTE — LETTER
Date & Time: 5/14/2024, 1:29 PM  Patient: Inge Barkley  Encounter Provider(s):    Chester De La Paz MD Akbari, Faisal, MD Patel, Otilio Chen MD       To Whom It May Concern:    Inge Barkley was seen and treated in our department on 5/9/2024 - 5/14/2024. She can return to work on 5/16/2024.    If you have any questions or concerns, please do not hesitate to call.        _Otilio Yepez MD_______________  Physician/APC Signature

## (undated) NOTE — ED AVS SNAPSHOT
Michael Munguia   MRN: [id number]    Department:  THE [facility] Emergency Department in [place]   Date of Visit:  5/4/2018           Disclosure     Insurance plans vary and the physician(s) referred by the ER may not be covered by your plan.  Please contact tell this physician (or your personal doctor if your instructions are to return to your personal doctor) about any new or lasting problems. The primary care or specialist physician will see patients referred from the [facility] Emergency Department.  Anirudh Vazquez

## (undated) NOTE — LETTER
[address]  [address]  Authorization for Surgical Operation and Procedure     Date:______5/11/2024_____                                                                                                         Time:__________  I hereby authorize Surgeon(s):  Yulisa North DO, my physician and his/her assistants (if applicable), which may include medical students, residents, and/or fellows, to perform the following surgical operation/ procedure and administer such anesthesia as may be determined necessary by my physician:  Operation/Procedure name (s) Procedure(s):  ESOPHAGOGASTRODUODENOSCOPY (EGD) on Inge Calvoryanne   2.   I recognize that during the surgical operation/procedure, unforeseen conditions may necessitate additional or different procedures than those listed above.  I, therefore, further authorize and request that the above-named surgeon, assistants, or designees perform such procedures as are, in their judgment, necessary and desirable.    3.   My surgeon/physician has discussed prior to my surgery the potential benefits, risks and side effects of this procedure; the likelihood of achieving goals; and potential problems that might occur during recuperation.  They also discussed reasonable alternatives to the procedure, including risks, benefits, and side effects related to the alternatives and risks related to not receiving this procedure.  I have had all my questions answered and I acknowledge that no guarantee has been made as to the result that may be obtained.    4.   Should the need arise during my operation/procedure, which includes change of level of care prior to discharge, I also consent to the administration of blood and/or blood products.  Further, I understand that despite careful testing and screening of blood or blood products by collecting agencies, I may still be subject to ill effects as a result of receiving a blood transfusion and/or blood  products.  The following are some, but not all, of the potential risks that can occur: fever and allergic reactions, hemolytic reactions, transmission of diseases such as Hepatitis, AIDS and Cytomegalovirus (CMV) and fluid overload.  In the event that I wish to have an autologous transfusion of my own blood, or a directed donor transfusion, I will discuss this with my physician.  Check only if Refusing Blood or Blood Products  I understand refusal of blood or blood products as deemed necessary by my physician may have serious consequences to my condition to include possible death. I hereby assume responsibility for my refusal and release the hospital, its personnel, and my physicians from any responsibility for the consequences of my refusal.          o  Refuse      5.   I authorize the use of any specimen, organs, tissues, body parts or foreign objects that may be removed from my body during the operation/procedure for diagnosis, research or teaching purposes and their subsequent disposal by hospital authorities.  I also authorize the release of specimen test results and/or written reports to my treating physician on the hospital medical staff or other referring or consulting physicians involved in my care, at the discretion of the Pathologist or my treating physician.    6.   I consent to the photographing or videotaping of the operations or procedures to be performed, including appropriate portions of my body for medical, scientific, or educational purposes, provided my identity is not revealed by the pictures or by descriptive texts accompanying them.  If the procedure has been photographed/videotaped, the surgeon will obtain the original picture, image, videotape or CD.  The hospital will not be responsible for storage, release or maintenance of the picture, image, tape or CD.    7.   I consent to the presence of a  or observers in the operating room as deemed necessary by my physician or  their designees.    8.   I recognize that in the event my procedure results in extended X-Ray/fluoroscopy time, I may develop a skin reaction.    9. If I have a Do Not Attempt Resuscitation (DNAR) order in place, that status will be suspended while in the operating room, procedural suite, and during the recovery period unless otherwise explicitly stated by me (or a person authorized to consent on my behalf). The surgeon or my attending physician will determine when the applicable recovery period ends for purposes of reinstating the DNAR order.  10. Patients having a sterilization procedure: I understand that if the procedure is successful the results will be permanent and it will therefore be impossible for me to inseminate, conceive, or bear children.  I also understand that the procedure is intended to result in sterility, although the result has not been guaranteed.   11. I acknowledge that my physician has explained sedation/analgesia administration to me including the risk and benefits I consent to the administration of sedation/analgesia as may be necessary or desirable in the judgment of my physician.    I CERTIFY THAT I HAVE READ AND FULLY UNDERSTAND THE ABOVE CONSENT TO OPERATION and/or OTHER PROCEDURE.    _________________________________________  __________________________________  Signature of Patient     Signature of Responsible Person         ___________________________________         Printed Name of Responsible Person           _________________________________                 Relationship to Patient  _________________________________________  ______________________________  Signature of Witness          Date  Time      Patient Name: Inge JOHNSON Rubia     : 2000                 Printed: May 11, 2024     Medical Record #: [id number]                     Page [address] of [address] [place] [address]  [id number]    Consent for Anesthesia    Inge WELLS  [place] agree to be cared for by an anesthesiologist, who is specially trained to monitor me and give me medicine to put me to sleep or keep me comfortable during my procedure    I understand that my anesthesiologist is not an employee or agent of [facility] or [employer]. He or she works for [employer].    As the patient asking for anesthesia services, I agree to:  Allow the anesthesiologist (anesthesia doctor) to give me medicine and do additional procedures as necessary. Some examples are: Starting or using an “IV” to give me medicine, fluids or blood during my procedure, and having a breathing tube placed to help me breathe when I’m asleep (intubation). In the event that my heart stops working properly, I understand that my anesthesiologist will make every effort to sustain my life, unless otherwise directed by [facility] Do Not Resuscitate documents.  Tell my anesthesia doctor before my procedure:  If I am pregnant.  The last time that I ate or drank.  All of the medicines I take (including prescriptions, herbal supplements, and pills I can buy without a prescription (including street drugs/illegal medications). Failure to inform my anesthesiologist about these medicines may increase my risk of anesthetic complications.  If I am allergic to anything or have had a reaction to anesthesia before.  I understand how the anesthesia medicine will help me (benefits).  I understand that with any type of anesthesia medicine there are risks:  The most common risks are: nausea, vomiting, sore throat, muscle soreness, damage to my eyes, mouth, or teeth (from breathing tube placement).  Rare risks include: remembering what happened during my procedure, allergic reactions to medications, injury to my airway, heart, lungs, vision, nerves, or muscles and in extremely rare instances death.  My doctor has explained to me other choices available to me for my care (alternatives).  Pregnant  Patients (“epidural”):  I understand that the risks of having an epidural (medicine given into my back to help control pain during labor), include itching, low blood pressure, difficulty urinating, headache or slowing of the baby’s heart. Very rare risks include infection, bleeding, seizure, irregular heart rhythms and nerve injury.  Regional Anesthesia (“spinal”, “epidural”, & “nerve blocks”):  I understand that rare but potential complications include headache, bleeding, infection, seizure, irregular heart rhythms, and nerve injury.    I can change my mind about having anesthesia services at any time before I get the medicine.    _____________________________________________________________________________  Patient (or Representative) Signature/Relationship to Patient  Date   Time    _____________________________________________________________________________   Name (if used)    Language/Organization   Time    _____________________________________________________________________________  Anesthesiologist Signature     Date   Time  I have discussed the procedure and information above with the patient (or patient’s representative) and answered their questions. The patient or their representative has agreed to have anesthesia services.    _____________________________________________________________________________  Witness        Date   Time  I have verified that the signature is that of the patient or patient’s representative, and that it was signed before the procedure  Patient Name: Inge Barkley     : 2000                 Printed: May 11, 2024     Medical Record #: [id number]                     Page 2 of 2